# Patient Record
Sex: MALE | Race: WHITE | NOT HISPANIC OR LATINO | Employment: FULL TIME | URBAN - METROPOLITAN AREA
[De-identification: names, ages, dates, MRNs, and addresses within clinical notes are randomized per-mention and may not be internally consistent; named-entity substitution may affect disease eponyms.]

---

## 2017-01-04 ENCOUNTER — LAB VISIT (OUTPATIENT)
Dept: LAB | Facility: HOSPITAL | Age: 47
End: 2017-01-04
Attending: UROLOGY
Payer: COMMERCIAL

## 2017-01-04 DIAGNOSIS — E29.1 MALE HYPOGONADISM: ICD-10-CM

## 2017-01-04 LAB
ERYTHROCYTE [DISTWIDTH] IN BLOOD BY AUTOMATED COUNT: 11.8 %
HCT VFR BLD AUTO: 44.5 %
HGB BLD-MCNC: 15.8 G/DL
MCH RBC QN AUTO: 32.4 PG
MCHC RBC AUTO-ENTMCNC: 35.5 %
MCV RBC AUTO: 91 FL
PLATELET # BLD AUTO: 219 K/UL
PMV BLD AUTO: 10.1 FL
RBC # BLD AUTO: 4.87 M/UL
TESTOST SERPL-MCNC: 226 NG/DL
WBC # BLD AUTO: 4.43 K/UL

## 2017-01-04 PROCEDURE — 36415 COLL VENOUS BLD VENIPUNCTURE: CPT | Mod: PO

## 2017-01-04 PROCEDURE — 85027 COMPLETE CBC AUTOMATED: CPT

## 2017-01-04 PROCEDURE — 84403 ASSAY OF TOTAL TESTOSTERONE: CPT

## 2017-01-11 ENCOUNTER — OFFICE VISIT (OUTPATIENT)
Dept: UROLOGY | Facility: CLINIC | Age: 47
End: 2017-01-11
Payer: COMMERCIAL

## 2017-01-11 VITALS
BODY MASS INDEX: 34.53 KG/M2 | DIASTOLIC BLOOD PRESSURE: 90 MMHG | WEIGHT: 220 LBS | HEIGHT: 67 IN | SYSTOLIC BLOOD PRESSURE: 128 MMHG | HEART RATE: 85 BPM

## 2017-01-11 DIAGNOSIS — R79.89 LOW TESTOSTERONE: Primary | ICD-10-CM

## 2017-01-11 PROCEDURE — 99213 OFFICE O/P EST LOW 20 MIN: CPT | Mod: S$GLB,,, | Performed by: UROLOGY

## 2017-01-11 PROCEDURE — 1159F MED LIST DOCD IN RCRD: CPT | Mod: S$GLB,,, | Performed by: UROLOGY

## 2017-01-11 PROCEDURE — 99999 PR PBB SHADOW E&M-EST. PATIENT-LVL III: CPT | Mod: PBBFAC,,, | Performed by: UROLOGY

## 2017-01-11 RX ORDER — TESTOSTERONE CYPIONATE 200 MG/ML
200 INJECTION, SOLUTION INTRAMUSCULAR
Qty: 10 ML | Refills: 2 | Status: SHIPPED | OUTPATIENT
Start: 2017-01-11 | End: 2018-03-23

## 2017-01-11 NOTE — PROGRESS NOTES
Subjective:       Patient ID: Emily aWlls is a 46 y.o. male.    Chief Complaint: Annual Exam    HPI     46 year old with low testosterone. His baseline testosterone is 157. He complains of decreased energy and libido. Symptoms began > 4 years ago. His SHARMAINE score 8/10 positive. He completed a 6 months trial of IM testosterone and he was doing well with improved energy and libido.  He is now off testosterone for the last 6 months.  Rebound testosterone is 226.  No family history of prostate cancer.  PSA 0.6 4/16  Urine dipstick shows negative for all components.    Review of Systems   Constitutional: Negative for fever.   Genitourinary: Negative for dysuria and hematuria.       Objective:      Physical Exam   Constitutional: He is oriented to person, place, and time. He appears well-developed and well-nourished.   Pulmonary/Chest: Effort normal.   Neurological: He is alert and oriented to person, place, and time.   Skin: No rash noted.   Psychiatric: He has a normal mood and affect.   Vitals reviewed.      Assessment:       1. Low testosterone        Plan:       Low testosterone  -     testosterone cypionate (DEPOTESTOTERONE CYPIONATE) 200 mg/mL injection; Inject 1 mL (200 mg total) into the muscle every 14 (fourteen) days.  Dispense: 10 mL; Refill: 2  -     Prostate Specific Antigen, Diagnostic; Future; Expected date: 7/14/17  -     Testosterone; Future; Expected date: 7/14/17  -     Hematocrit; Future; Expected date: 7/14/17  -     Hemoglobin; Future; Expected date: 7/14/17        Resume testosterone replacement.  200 mg IM every 2 weeks. RTC 6 months with labs.

## 2017-01-11 NOTE — MR AVS SNAPSHOT
"    [facility]  [address] [facility] [address]  [facility] [place] [address]  Phone: [phone]                  Emily Walls   2017 9:30 AM   Office Visit    Description:  Male : 1970   Provider:  GERARD Hull MD   Department:  [facility]           Reason for Visit     Annual Exam                To Do List           Goals (5 Years of Data)     None      [facility]     [facility] Care Line -  Assistance  Registered nurses in the [facility] provide clinical advisement, health education, appointment booking, and other advisory services.  Call for this free service at [phone].             Medications           Message regarding Medications     Verify the changes and/or additions to your medication regime listed below are the same as discussed with your clinician today.  If any of these changes or additions are incorrect, please notify your healthcare provider.        STOP taking these medications     guaifenesin (MUCINEX) 600 mg 12 hr tablet Take 1 tablet (600 mg total) by mouth 2 (two) times daily.           Verify that the below list of medications is an accurate representation of the medications you are currently taking.  If none reported, the list may be blank. If incorrect, please contact your healthcare provider. Carry this list with you in case of emergency.           Current Medications     busPIRone (BUSPAR) 15 MG tablet Take 15 mg by mouth 2 (two) times daily.           Clinical Reference Information           Vital Signs - Last Recorded  Most recent update: 2017  9:34 AM by Lynn Montero LPN    BP Pulse Ht Wt BMI    (!) 128/90 85 5' 7" (1.702 m) 99.8 kg (220 lb 0.3 oz) 34.46 kg/m2      Blood Pressure          Most Recent Value    BP  (!)  128/90      Allergies as of 2017     No Known Allergies      Immunizations Administered on Date of Encounter - 2017     None      "

## 2017-03-27 ENCOUNTER — TELEPHONE (OUTPATIENT)
Dept: UROLOGY | Facility: CLINIC | Age: 47
End: 2017-03-27

## 2017-03-27 RX ORDER — TADALAFIL 20 MG/1
20 TABLET ORAL DAILY PRN
COMMUNITY
End: 2018-03-23

## 2017-03-27 NOTE — TELEPHONE ENCOUNTER
----- Message from Ernestinarita Majano sent at 3/27/2017  9:09 AM CDT -----  Refill on [facility].  Please send into [facility].  Any questions call [phone].

## 2018-02-27 RX ORDER — OSELTAMIVIR PHOSPHATE 75 MG/1
75 CAPSULE ORAL 2 TIMES DAILY
Refills: 0 | COMMUNITY
Start: 2017-12-14 | End: 2018-03-23

## 2018-02-27 RX ORDER — BUSPIRONE HYDROCHLORIDE 7.5 MG/1
7.5 TABLET ORAL DAILY
Refills: 1 | COMMUNITY
Start: 2018-01-12 | End: 2018-03-23

## 2018-03-23 ENCOUNTER — OFFICE VISIT (OUTPATIENT)
Dept: UROLOGY | Facility: CLINIC | Age: 48
End: 2018-03-23
Payer: COMMERCIAL

## 2018-03-23 VITALS — HEIGHT: 67 IN | BODY MASS INDEX: 37.72 KG/M2 | WEIGHT: 240.31 LBS

## 2018-03-23 DIAGNOSIS — Z12.5 SCREENING FOR PROSTATE CANCER: ICD-10-CM

## 2018-03-23 DIAGNOSIS — R35.0 URINARY FREQUENCY: Primary | ICD-10-CM

## 2018-03-23 DIAGNOSIS — N40.1 ENLARGED PROSTATE WITH URINARY OBSTRUCTION: ICD-10-CM

## 2018-03-23 DIAGNOSIS — R79.89 LOW TESTOSTERONE LEVEL IN MALE: ICD-10-CM

## 2018-03-23 DIAGNOSIS — N13.8 ENLARGED PROSTATE WITH URINARY OBSTRUCTION: ICD-10-CM

## 2018-03-23 LAB
BILIRUB SERPL-MCNC: NORMAL MG/DL
BLOOD URINE, POC: NORMAL
COLOR, POC UA: YELLOW
GLUCOSE UR QL STRIP: NORMAL
KETONES UR QL STRIP: NORMAL
LEUKOCYTE ESTERASE URINE, POC: NORMAL
NITRITE, POC UA: NORMAL
PH, POC UA: 5.5
PROTEIN, POC: NORMAL
SPECIFIC GRAVITY, POC UA: 1.02
UROBILINOGEN, POC UA: NORMAL

## 2018-03-23 PROCEDURE — 81002 URINALYSIS NONAUTO W/O SCOPE: CPT | Mod: S$GLB,,, | Performed by: UROLOGY

## 2018-03-23 PROCEDURE — 99214 OFFICE O/P EST MOD 30 MIN: CPT | Mod: 25,S$GLB,, | Performed by: UROLOGY

## 2018-03-23 PROCEDURE — 99999 PR PBB SHADOW E&M-EST. PATIENT-LVL II: CPT | Mod: PBBFAC,,, | Performed by: UROLOGY

## 2018-03-23 RX ORDER — TAMSULOSIN HYDROCHLORIDE 0.4 MG/1
0.4 CAPSULE ORAL DAILY
Qty: 30 CAPSULE | Refills: 11 | Status: SHIPPED | OUTPATIENT
Start: 2018-03-23 | End: 2019-02-01 | Stop reason: ALTCHOICE

## 2018-03-23 RX ORDER — METOPROLOL SUCCINATE 50 MG/1
TABLET, EXTENDED RELEASE ORAL
COMMUNITY
Start: 2018-02-26 | End: 2022-01-17

## 2018-03-23 RX ORDER — LORAZEPAM 0.5 MG/1
0.5 TABLET ORAL EVERY 6 HOURS PRN
COMMUNITY
End: 2020-12-28

## 2018-03-23 RX ORDER — ATORVASTATIN CALCIUM 10 MG/1
TABLET, FILM COATED ORAL
COMMUNITY
Start: 2018-02-26 | End: 2019-08-21

## 2018-03-23 NOTE — PROGRESS NOTES
Subjective:       Patient ID: Emily Walls is a 47 y.o. male.    Chief Complaint: Follow-up (bladder pain)    HPI     47 year old with low testosterone.  His baseline testosterone is 223 and we resumed testosterone replacement last year.  Due to work schedule he was unable to continue and has no been on testosterone replacement for the last 6 months.  He complains of decreased energy and libido. Symptoms were improved while on testosterone injections.   He say he began having bladder pain 4 days ago.  The was acute onset.  Symptoms have improved today.  He had no other associated symptoms.  He denies hematuria and dysuria.  No change in voiding pattern although he does have significant obstructive symptoms at baseline.  AUA symptoms score is 25.  He says he was eating crawfish the day before symptoms started.  He has never had a problem with spicy foods in the past.  He denies constipation.  No new medication  Urine dipstick shows negative for all components.  PVR 90 ml     Review of Systems   Constitutional: Negative for fever.   Genitourinary: Negative for dysuria and hematuria.       Objective:      Physical Exam   Constitutional: He is oriented to person, place, and time. He appears well-developed and well-nourished.   HENT:   Head: Normocephalic and atraumatic.   Eyes: Conjunctivae are normal.   Cardiovascular: Normal rate.    Pulmonary/Chest: Effort normal.   Abdominal: Soft. He exhibits no distension. There is tenderness in the suprapubic area. There is no CVA tenderness.   Genitourinary: Penis normal. Rectal exam shows no mass and anal tone normal. Prostate is enlarged (30g, s/s/a). Prostate is not tender.   Genitourinary Comments: Bilateral testes atrophic   Musculoskeletal: Normal range of motion. He exhibits no edema.   Neurological: He is alert and oriented to person, place, and time.   Skin: Skin is warm and dry. No rash noted.   Psychiatric: He has a normal mood and affect.   Vitals reviewed.       Assessment:       1. Urinary frequency    2. Enlarged prostate with urinary obstruction    3. Screening for prostate cancer    4. Low testosterone level in male        Plan:       Urinary frequency  -     POCT URINE DIPSTICK WITHOUT MICROSCOPE    Enlarged prostate with urinary obstruction  -     tamsulosin (FLOMAX) 0.4 mg Cp24; Take 1 capsule (0.4 mg total) by mouth once daily.  Dispense: 30 capsule; Refill: 11    Screening for prostate cancer  -     PSA, Screening; Future; Expected date: 03/23/2018    Low testosterone level in male  -     Testosterone; Future; Expected date: 03/23/2018      f/u 3 months.  Start Flomax.

## 2018-10-09 ENCOUNTER — LAB VISIT (OUTPATIENT)
Dept: LAB | Facility: HOSPITAL | Age: 48
End: 2018-10-09
Attending: UROLOGY
Payer: COMMERCIAL

## 2018-10-09 DIAGNOSIS — Z12.5 SCREENING FOR PROSTATE CANCER: ICD-10-CM

## 2018-10-09 DIAGNOSIS — R79.89 LOW TESTOSTERONE LEVEL IN MALE: ICD-10-CM

## 2018-10-09 LAB
COMPLEXED PSA SERPL-MCNC: 0.26 NG/ML
TESTOST SERPL-MCNC: 183 NG/DL

## 2018-10-09 PROCEDURE — 36415 COLL VENOUS BLD VENIPUNCTURE: CPT | Mod: PO

## 2018-10-09 PROCEDURE — 84403 ASSAY OF TOTAL TESTOSTERONE: CPT

## 2018-10-09 PROCEDURE — 84153 ASSAY OF PSA TOTAL: CPT

## 2018-10-18 ENCOUNTER — OFFICE VISIT (OUTPATIENT)
Dept: UROLOGY | Facility: CLINIC | Age: 48
End: 2018-10-18
Payer: COMMERCIAL

## 2018-10-18 VITALS
HEIGHT: 67 IN | HEART RATE: 86 BPM | SYSTOLIC BLOOD PRESSURE: 126 MMHG | BODY MASS INDEX: 39.24 KG/M2 | DIASTOLIC BLOOD PRESSURE: 70 MMHG | WEIGHT: 250 LBS

## 2018-10-18 DIAGNOSIS — E29.1 MALE HYPOGONADISM: Primary | ICD-10-CM

## 2018-10-18 LAB
BILIRUB SERPL-MCNC: NORMAL MG/DL
BLOOD URINE, POC: NORMAL
COLOR, POC UA: YELLOW
GLUCOSE UR QL STRIP: NORMAL
KETONES UR QL STRIP: NORMAL
LEUKOCYTE ESTERASE URINE, POC: NORMAL
NITRITE, POC UA: NORMAL
PH, POC UA: 5
PROTEIN, POC: NORMAL
SPECIFIC GRAVITY, POC UA: 1.02
UROBILINOGEN, POC UA: NORMAL

## 2018-10-18 PROCEDURE — 3008F BODY MASS INDEX DOCD: CPT | Mod: CPTII,S$GLB,, | Performed by: UROLOGY

## 2018-10-18 PROCEDURE — 99213 OFFICE O/P EST LOW 20 MIN: CPT | Mod: 25,S$GLB,, | Performed by: UROLOGY

## 2018-10-18 PROCEDURE — 99999 PR PBB SHADOW E&M-EST. PATIENT-LVL III: CPT | Mod: PBBFAC,,, | Performed by: UROLOGY

## 2018-10-18 PROCEDURE — 81002 URINALYSIS NONAUTO W/O SCOPE: CPT | Mod: S$GLB,,, | Performed by: UROLOGY

## 2018-10-18 RX ORDER — TESTOSTERONE CYPIONATE 200 MG/ML
200 INJECTION, SOLUTION INTRAMUSCULAR
Qty: 10 ML | Refills: 2 | Status: SHIPPED | OUTPATIENT
Start: 2018-10-18 | End: 2019-08-07 | Stop reason: SDUPTHER

## 2018-10-18 NOTE — PROGRESS NOTES
Subjective:       Patient ID: Emily Walls is a 48 y.o. male.    Chief Complaint: Low Testosterone    HPI     48 year old with low testosterone.   He had been on IM testosterone replacement in the past and had improvement in his symptoms.  Due to his work schedule the injections became problematic and he stopped injection last year.  He now complains of decreased energy and libido.   He wants to resume injections.  Rebound testosterone is 183.  No family history of prostate cancer.  PSA 0.26 10/18  Urine dipstick shows negative for all components.    Review of Systems   Constitutional: Negative for fever.   Genitourinary: Negative for dysuria and hematuria.       Objective:      Physical Exam   Constitutional: He is oriented to person, place, and time. He appears well-developed and well-nourished.   Pulmonary/Chest: Effort normal.   Neurological: He is alert and oriented to person, place, and time.   Skin: No rash noted.   Psychiatric: He has a normal mood and affect.   Vitals reviewed.      Assessment:       1. Male hypogonadism        Plan:       Male hypogonadism  -     POCT URINE DIPSTICK WITHOUT MICROSCOPE  -     Testosterone; Future; Expected date: 01/18/2019  -     Hematocrit; Future; Expected date: 01/18/2019  -     Hemoglobin; Future; Expected date: 01/18/2019    Other orders  -     testosterone cypionate (DEPOTESTOTERONE CYPIONATE) 200 mg/mL injection; Inject 1 mL (200 mg total) into the muscle every 14 (fourteen) days.  Dispense: 10 mL; Refill: 2      Resume testosterone replacement.  RTC 3 months with repeat labs.

## 2019-01-29 ENCOUNTER — LAB VISIT (OUTPATIENT)
Dept: LAB | Facility: HOSPITAL | Age: 49
End: 2019-01-29
Attending: UROLOGY
Payer: COMMERCIAL

## 2019-01-29 DIAGNOSIS — E29.1 MALE HYPOGONADISM: ICD-10-CM

## 2019-01-29 LAB
HCT VFR BLD AUTO: 49.7 %
HGB BLD-MCNC: 16.7 G/DL
TESTOST SERPL-MCNC: 891 NG/DL

## 2019-01-29 PROCEDURE — 85018 HEMOGLOBIN: CPT

## 2019-01-29 PROCEDURE — 84403 ASSAY OF TOTAL TESTOSTERONE: CPT

## 2019-01-29 PROCEDURE — 36415 COLL VENOUS BLD VENIPUNCTURE: CPT | Mod: PO

## 2019-01-29 PROCEDURE — 85014 HEMATOCRIT: CPT

## 2019-02-01 ENCOUNTER — OFFICE VISIT (OUTPATIENT)
Dept: UROLOGY | Facility: CLINIC | Age: 49
End: 2019-02-01
Payer: COMMERCIAL

## 2019-02-01 VITALS — BODY MASS INDEX: 39.65 KG/M2 | HEIGHT: 67 IN | WEIGHT: 252.63 LBS

## 2019-02-01 DIAGNOSIS — E29.1 MALE HYPOGONADISM: Primary | ICD-10-CM

## 2019-02-01 LAB
BILIRUB SERPL-MCNC: NORMAL MG/DL
BLOOD URINE, POC: NORMAL
COLOR, POC UA: YELLOW
GLUCOSE UR QL STRIP: NORMAL
KETONES UR QL STRIP: NORMAL
LEUKOCYTE ESTERASE URINE, POC: NORMAL
NITRITE, POC UA: NORMAL
PH, POC UA: 6
PROTEIN, POC: NORMAL
SPECIFIC GRAVITY, POC UA: 1.02
UROBILINOGEN, POC UA: NORMAL

## 2019-02-01 PROCEDURE — 99213 PR OFFICE/OUTPT VISIT, EST, LEVL III, 20-29 MIN: ICD-10-PCS | Mod: 25,S$GLB,, | Performed by: UROLOGY

## 2019-02-01 PROCEDURE — 81002 POCT URINE DIPSTICK WITHOUT MICROSCOPE: ICD-10-PCS | Mod: S$GLB,,, | Performed by: UROLOGY

## 2019-02-01 PROCEDURE — 99999 PR PBB SHADOW E&M-EST. PATIENT-LVL III: ICD-10-PCS | Mod: PBBFAC,,, | Performed by: UROLOGY

## 2019-02-01 PROCEDURE — 81002 URINALYSIS NONAUTO W/O SCOPE: CPT | Mod: S$GLB,,, | Performed by: UROLOGY

## 2019-02-01 PROCEDURE — 99213 OFFICE O/P EST LOW 20 MIN: CPT | Mod: 25,S$GLB,, | Performed by: UROLOGY

## 2019-02-01 PROCEDURE — 99999 PR PBB SHADOW E&M-EST. PATIENT-LVL III: CPT | Mod: PBBFAC,,, | Performed by: UROLOGY

## 2019-02-01 PROCEDURE — 3008F BODY MASS INDEX DOCD: CPT | Mod: CPTII,S$GLB,, | Performed by: UROLOGY

## 2019-02-01 PROCEDURE — 3008F PR BODY MASS INDEX (BMI) DOCUMENTED: ICD-10-PCS | Mod: CPTII,S$GLB,, | Performed by: UROLOGY

## 2019-02-01 RX ORDER — METFORMIN HYDROCHLORIDE 500 MG/1
TABLET ORAL
Refills: 2 | COMMUNITY
Start: 2018-12-12 | End: 2019-02-01 | Stop reason: ALTCHOICE

## 2019-02-01 NOTE — PROGRESS NOTES
Subjective:       Patient ID: Emily Walls is a 48 y.o. male.    Chief Complaint: Follow-up and Low Testosterone    HPI     48 year old with low testosterone.   He had been on IM testosterone replacement in the past.   Due to his work schedule the injections became problematic and he stopped injections but now has resumed for the last 3 months.  He is doing well.  He feels there is increased energy and libido.  Rebound testosterone was 183.  No family history of prostate cancer.  PSA 0.26 10/18.  He has mild PV dribbling and tried Flomax.  He had no improvement.  Symptoms are not bothersome.  Peak testosterone is 891,  H&H normal.  Urine dipstick shows negative for all components.    Review of Systems   Constitutional: Negative for fever.   Genitourinary: Negative for dysuria and hematuria.       Objective:      Physical Exam   Constitutional: He is oriented to person, place, and time. He appears well-developed and well-nourished.   Pulmonary/Chest: Effort normal.   Abdominal: Soft.   Neurological: He is alert and oriented to person, place, and time.   Skin: No rash noted.   Psychiatric: He has a normal mood and affect.   Vitals reviewed.      Assessment:       1. Male hypogonadism        Plan:       Male hypogonadism  -     POCT urine dipstick without microscope  -     Testosterone; Future; Expected date: 08/04/2019  -     Prostate Specific Antigen, Diagnostic; Future; Expected date: 08/04/2019  -     Hemoglobin; Future; Expected date: 08/04/2019  -     Hematocrit; Future; Expected date: 08/04/2019        Continue testosterone at current does.  RTC 6 months with repeat labs.

## 2019-03-06 DIAGNOSIS — N52.9 ERECTILE DYSFUNCTION, UNSPECIFIED ERECTILE DYSFUNCTION TYPE: Primary | ICD-10-CM

## 2019-03-06 RX ORDER — TADALAFIL 20 MG/1
20 TABLET ORAL DAILY
Qty: 30 TABLET | Refills: 11 | Status: SHIPPED | OUTPATIENT
Start: 2019-03-06 | End: 2019-03-07 | Stop reason: SDUPTHER

## 2019-03-07 DIAGNOSIS — N52.9 ERECTILE DYSFUNCTION, UNSPECIFIED ERECTILE DYSFUNCTION TYPE: ICD-10-CM

## 2019-03-11 RX ORDER — TADALAFIL 20 MG/1
20 TABLET ORAL DAILY
Qty: 30 TABLET | Refills: 11 | Status: SHIPPED | OUTPATIENT
Start: 2019-03-11 | End: 2020-03-13 | Stop reason: SDUPTHER

## 2019-08-07 DIAGNOSIS — E29.1 MALE HYPOGONADISM: Primary | ICD-10-CM

## 2019-08-07 RX ORDER — TESTOSTERONE CYPIONATE 200 MG/ML
200 INJECTION, SOLUTION INTRAMUSCULAR
Qty: 2 ML | Refills: 0 | Status: SHIPPED | OUTPATIENT
Start: 2019-08-07 | End: 2019-08-21 | Stop reason: SDUPTHER

## 2019-08-07 NOTE — TELEPHONE ENCOUNTER
Spoke to pt and he is scheduled for labs. Pt needs enough medication to give himself two more testosterones injection prior to his apt to see you. Please advise.

## 2019-08-07 NOTE — TELEPHONE ENCOUNTER
----- Message from Solo Tony sent at 8/7/2019  9:57 AM CDT -----  Contact: patient  Type:  RX Refill Request    Who Called:    Refill or New Rx:  refill  RX Name and Strength:  testosterone cypionate (DEPOTESTOTERONE CYPIONATE) 200 mg/mL injection  How is the patient currently taking it? (ex. 1XDay):    Is this a 30 day or 90 day RX:    Preferred Pharmacy with phone number:  [facility]  Local or Mail Order:  local  Ordering Provider:    [facility] Call Back Number:  [phone]  Additional Information:  Requesting a call back when script has been sent

## 2019-08-13 ENCOUNTER — LAB VISIT (OUTPATIENT)
Dept: LAB | Facility: HOSPITAL | Age: 49
End: 2019-08-13
Attending: UROLOGY
Payer: COMMERCIAL

## 2019-08-13 DIAGNOSIS — E29.1 MALE HYPOGONADISM: ICD-10-CM

## 2019-08-13 LAB
COMPLEXED PSA SERPL-MCNC: 0.38 NG/ML (ref 0–4)
HCT VFR BLD AUTO: 51.8 % (ref 40–54)
HGB BLD-MCNC: 17.7 G/DL (ref 14–18)
TESTOST SERPL-MCNC: 822 NG/DL (ref 304–1227)

## 2019-08-13 PROCEDURE — 36415 COLL VENOUS BLD VENIPUNCTURE: CPT | Mod: PO

## 2019-08-13 PROCEDURE — 84403 ASSAY OF TOTAL TESTOSTERONE: CPT

## 2019-08-13 PROCEDURE — 85018 HEMOGLOBIN: CPT

## 2019-08-13 PROCEDURE — 85014 HEMATOCRIT: CPT

## 2019-08-13 PROCEDURE — 84153 ASSAY OF PSA TOTAL: CPT

## 2019-08-21 ENCOUNTER — OFFICE VISIT (OUTPATIENT)
Dept: UROLOGY | Facility: CLINIC | Age: 49
End: 2019-08-21
Payer: COMMERCIAL

## 2019-08-21 VITALS
WEIGHT: 244.69 LBS | SYSTOLIC BLOOD PRESSURE: 106 MMHG | BODY MASS INDEX: 38.4 KG/M2 | HEIGHT: 67 IN | HEART RATE: 76 BPM | DIASTOLIC BLOOD PRESSURE: 71 MMHG

## 2019-08-21 DIAGNOSIS — E29.1 MALE HYPOGONADISM: Primary | ICD-10-CM

## 2019-08-21 LAB
BILIRUB SERPL-MCNC: NORMAL MG/DL
BLOOD URINE, POC: NORMAL
COLOR, POC UA: NORMAL
GLUCOSE UR QL STRIP: NORMAL
KETONES UR QL STRIP: NORMAL
LEUKOCYTE ESTERASE URINE, POC: NORMAL
NITRITE, POC UA: NORMAL
PH, POC UA: 5.5
PROTEIN, POC: NORMAL
SPECIFIC GRAVITY, POC UA: 1020
UROBILINOGEN, POC UA: NORMAL

## 2019-08-21 PROCEDURE — 81002 URINALYSIS NONAUTO W/O SCOPE: CPT | Mod: S$GLB,,, | Performed by: UROLOGY

## 2019-08-21 PROCEDURE — 99999 PR PBB SHADOW E&M-EST. PATIENT-LVL III: ICD-10-PCS | Mod: PBBFAC,,, | Performed by: UROLOGY

## 2019-08-21 PROCEDURE — 99213 PR OFFICE/OUTPT VISIT, EST, LEVL III, 20-29 MIN: ICD-10-PCS | Mod: 25,S$GLB,, | Performed by: UROLOGY

## 2019-08-21 PROCEDURE — 99999 PR PBB SHADOW E&M-EST. PATIENT-LVL III: CPT | Mod: PBBFAC,,, | Performed by: UROLOGY

## 2019-08-21 PROCEDURE — 99213 OFFICE O/P EST LOW 20 MIN: CPT | Mod: 25,S$GLB,, | Performed by: UROLOGY

## 2019-08-21 PROCEDURE — 3008F PR BODY MASS INDEX (BMI) DOCUMENTED: ICD-10-PCS | Mod: CPTII,S$GLB,, | Performed by: UROLOGY

## 2019-08-21 PROCEDURE — 81002 POCT URINE DIPSTICK WITHOUT MICROSCOPE: ICD-10-PCS | Mod: S$GLB,,, | Performed by: UROLOGY

## 2019-08-21 PROCEDURE — 3008F BODY MASS INDEX DOCD: CPT | Mod: CPTII,S$GLB,, | Performed by: UROLOGY

## 2019-08-21 RX ORDER — TESTOSTERONE CYPIONATE 200 MG/ML
200 INJECTION, SOLUTION INTRAMUSCULAR
Qty: 10 ML | Refills: 5 | Status: SHIPPED | OUTPATIENT
Start: 2019-08-21 | End: 2020-03-24 | Stop reason: SDUPTHER

## 2019-08-21 RX ORDER — LORAZEPAM 1 MG/1
1 TABLET ORAL DAILY
Refills: 5 | COMMUNITY
Start: 2019-08-06 | End: 2023-06-22 | Stop reason: SDUPTHER

## 2019-08-21 NOTE — PROGRESS NOTES
Subjective:       Patient ID: Emily Walls is a 49 y.o. male.    Chief Complaint: Male hypogonadism (follow up)    HPI     49 year old with low testosterone.   He is on IM testosterone replacement and his current doses 200 mg every 2 weeks.  He is doing well.  He feels there is increased energy and libido.  Peak testosterone is 822.  No family history of prostate cancer.  PSA 0.38 8/19.   he has no bothersome urinary symptoms.  H&H normal.  Urine dipstick shows negative for all components.    Review of Systems   Constitutional: Negative for fever.   Genitourinary: Negative for dysuria and hematuria.       Objective:      Physical Exam   Constitutional: He is oriented to person, place, and time. He appears well-developed and well-nourished.   Pulmonary/Chest: Effort normal.   Neurological: He is alert and oriented to person, place, and time.   Skin: No rash noted.   Psychiatric: He has a normal mood and affect.   Vitals reviewed.      Assessment:       1. Male hypogonadism        Plan:       Male hypogonadism  -     POCT URINE DIPSTICK WITHOUT MICROSCOPE  -     testosterone cypionate (DEPOTESTOTERONE CYPIONATE) 200 mg/mL injection; Inject 1 mL (200 mg total) into the muscle every 14 (fourteen) days.  Dispense: 10 mL; Refill: 5  -     Testosterone; Future; Expected date: 02/21/2020  -     Hemoglobin; Future; Expected date: 02/21/2020  -     Hematocrit; Future; Expected date: 02/21/2020      continue testosterone at current dose.  Follow-up 6 months with repeat labs.

## 2020-03-13 DIAGNOSIS — N52.9 ERECTILE DYSFUNCTION, UNSPECIFIED ERECTILE DYSFUNCTION TYPE: ICD-10-CM

## 2020-03-13 RX ORDER — TADALAFIL 20 MG/1
20 TABLET ORAL DAILY
Qty: 30 TABLET | Refills: 11 | Status: SHIPPED | OUTPATIENT
Start: 2020-03-13 | End: 2020-12-28 | Stop reason: DRUGHIGH

## 2020-03-13 NOTE — TELEPHONE ENCOUNTER
----- Message from Jennifer Truong sent at 3/13/2020  9:52 AM CDT -----  Contact: self [phone]  Patient requesting a refill on tadalafil (CIALIS) 20 MG Tab.    [facility] - [place] - [address]  [address] [place] [address]  Phone: [phone] Fax: [phone]    Please call patient at [phone]. Thanks!

## 2020-03-17 ENCOUNTER — TELEPHONE (OUTPATIENT)
Dept: UROLOGY | Facility: CLINIC | Age: 50
End: 2020-03-17

## 2020-03-17 NOTE — TELEPHONE ENCOUNTER
----- Message from Kanwal Rao sent at 3/17/2020  1:42 PM CDT -----  Contact: Pt  Type: Needs Medical Advice    Who Called: Pt  Symptoms (please be specific):    How long has patient had these symptoms:    Pharmacy name and phone #:    [facility] Call Back Number:  [phone]  Additional Information: need approval for refill of   testosterone cypionate (DEPOTESTOTERONE CYPIONATE) 200 mg/mL injection [[id number]]

## 2020-03-17 NOTE — TELEPHONE ENCOUNTER
Called in refill for enough medication for 2 injections and advised to follow up for bloodwork and fllow-up with doctor(possible video)

## 2020-03-23 ENCOUNTER — LAB VISIT (OUTPATIENT)
Dept: LAB | Facility: HOSPITAL | Age: 50
End: 2020-03-23
Attending: UROLOGY
Payer: COMMERCIAL

## 2020-03-23 DIAGNOSIS — E29.1 MALE HYPOGONADISM: ICD-10-CM

## 2020-03-23 LAB
HCT VFR BLD AUTO: 49.4 % (ref 40–54)
HGB BLD-MCNC: 16.5 G/DL (ref 14–18)
TESTOST SERPL-MCNC: 998 NG/DL (ref 304–1227)

## 2020-03-23 PROCEDURE — 85014 HEMATOCRIT: CPT

## 2020-03-23 PROCEDURE — 84403 ASSAY OF TOTAL TESTOSTERONE: CPT

## 2020-03-23 PROCEDURE — 36415 COLL VENOUS BLD VENIPUNCTURE: CPT | Mod: PO

## 2020-03-23 PROCEDURE — 85018 HEMOGLOBIN: CPT

## 2020-03-24 DIAGNOSIS — E29.1 MALE HYPOGONADISM: ICD-10-CM

## 2020-03-25 RX ORDER — TESTOSTERONE CYPIONATE 200 MG/ML
200 INJECTION, SOLUTION INTRAMUSCULAR
Qty: 10 ML | Refills: 1 | Status: SHIPPED | OUTPATIENT
Start: 2020-03-25 | End: 2020-09-16

## 2020-03-27 ENCOUNTER — PATIENT MESSAGE (OUTPATIENT)
Dept: UROLOGY | Facility: CLINIC | Age: 50
End: 2020-03-27

## 2020-03-27 ENCOUNTER — OFFICE VISIT (OUTPATIENT)
Dept: UROLOGY | Facility: CLINIC | Age: 50
End: 2020-03-27
Payer: COMMERCIAL

## 2020-03-27 DIAGNOSIS — R79.89 LOW TESTOSTERONE LEVEL IN MALE: ICD-10-CM

## 2020-03-27 DIAGNOSIS — Z12.5 SCREENING FOR PROSTATE CANCER: Primary | ICD-10-CM

## 2020-03-27 PROCEDURE — 99213 PR OFFICE/OUTPT VISIT, EST, LEVL III, 20-29 MIN: ICD-10-PCS | Mod: 95,,, | Performed by: UROLOGY

## 2020-03-27 PROCEDURE — 99213 OFFICE O/P EST LOW 20 MIN: CPT | Mod: 95,,, | Performed by: UROLOGY

## 2020-03-27 NOTE — PROGRESS NOTES
The patient location is:  home  The chief complaint leading to consultation is:  Low testosterone  Visit type: Virtual visit with synchronous audio and video  Total time spent with patient:  15 min  Each patient to whom he or she provides medical services by telemedicine is:  (1) informed of the relationship between the physician and patient and the respective role of any other health care provider with respect to management of the patient; and (2) notified that he or she may decline to receive medical services by telemedicine and may withdraw from such care at any time.    Notes:  See below      Subjective:       Patient ID: Emily Walls is a 49 y.o. male.    Chief Complaint: No chief complaint on file.    HPI     49 year old with low testosterone.   He is on IM testosterone replacement and his current doses 200 mg every 2 weeks.  He is doing well.  He feels there is increased energy and libido.  Peak testosterone is 998.  No family history of prostate cancer.  PSA 0.38 8/19.   he has no bothersome urinary symptoms.  H&H normal.    Review of Systems   Constitutional: Negative for fever.   Genitourinary: Negative for dysuria and hematuria.       Objective:      Physical Exam     none-virtual visit     Assessment:       1. Screening for prostate cancer    2. Low testosterone level in male        Plan:       Screening for prostate cancer  -     PSA, Screening; Future; Expected date: 09/27/2020  -     Testosterone; Future; Expected date: 09/27/2020  -     Hemoglobin; Future; Expected date: 09/27/2020  -     Hematocrit; Future; Expected date: 09/27/2020    Low testosterone level in male      continue testosterone at current dose.  Follow-up 6 months with repeat labs.

## 2020-11-05 ENCOUNTER — LAB VISIT (OUTPATIENT)
Dept: LAB | Facility: HOSPITAL | Age: 50
End: 2020-11-05
Attending: UROLOGY
Payer: COMMERCIAL

## 2020-11-05 DIAGNOSIS — Z12.5 SCREENING FOR PROSTATE CANCER: ICD-10-CM

## 2020-11-05 LAB
COMPLEXED PSA SERPL-MCNC: 0.33 NG/ML (ref 0–4)
HCT VFR BLD AUTO: 48.5 % (ref 40–54)
HGB BLD-MCNC: 16.5 G/DL (ref 14–18)
TESTOST SERPL-MCNC: 620 NG/DL (ref 304–1227)

## 2020-11-05 PROCEDURE — 84403 ASSAY OF TOTAL TESTOSTERONE: CPT

## 2020-11-05 PROCEDURE — 84153 ASSAY OF PSA TOTAL: CPT

## 2020-11-05 PROCEDURE — 85014 HEMATOCRIT: CPT

## 2020-11-05 PROCEDURE — 85018 HEMOGLOBIN: CPT

## 2020-11-05 PROCEDURE — 36415 COLL VENOUS BLD VENIPUNCTURE: CPT | Mod: PO

## 2020-12-28 ENCOUNTER — OFFICE VISIT (OUTPATIENT)
Dept: UROLOGY | Facility: CLINIC | Age: 50
End: 2020-12-28
Payer: COMMERCIAL

## 2020-12-28 VITALS
DIASTOLIC BLOOD PRESSURE: 92 MMHG | HEART RATE: 84 BPM | WEIGHT: 244.69 LBS | BODY MASS INDEX: 38.4 KG/M2 | SYSTOLIC BLOOD PRESSURE: 142 MMHG | HEIGHT: 67 IN

## 2020-12-28 DIAGNOSIS — N40.1 ENLARGED PROSTATE WITH URINARY OBSTRUCTION: ICD-10-CM

## 2020-12-28 DIAGNOSIS — N52.9 ERECTILE DYSFUNCTION, UNSPECIFIED ERECTILE DYSFUNCTION TYPE: ICD-10-CM

## 2020-12-28 DIAGNOSIS — Z12.5 SCREENING FOR PROSTATE CANCER: Primary | ICD-10-CM

## 2020-12-28 DIAGNOSIS — N13.8 ENLARGED PROSTATE WITH URINARY OBSTRUCTION: ICD-10-CM

## 2020-12-28 DIAGNOSIS — R79.89 LOW TESTOSTERONE LEVEL IN MALE: ICD-10-CM

## 2020-12-28 PROCEDURE — 1126F PR PAIN SEVERITY QUANTIFIED, NO PAIN PRESENT: ICD-10-PCS | Mod: S$GLB,,, | Performed by: UROLOGY

## 2020-12-28 PROCEDURE — 99999 PR PBB SHADOW E&M-EST. PATIENT-LVL III: CPT | Mod: PBBFAC,,, | Performed by: UROLOGY

## 2020-12-28 PROCEDURE — 3008F PR BODY MASS INDEX (BMI) DOCUMENTED: ICD-10-PCS | Mod: CPTII,S$GLB,, | Performed by: UROLOGY

## 2020-12-28 PROCEDURE — 1126F AMNT PAIN NOTED NONE PRSNT: CPT | Mod: S$GLB,,, | Performed by: UROLOGY

## 2020-12-28 PROCEDURE — 99214 PR OFFICE/OUTPT VISIT, EST, LEVL IV, 30-39 MIN: ICD-10-PCS | Mod: S$GLB,,, | Performed by: UROLOGY

## 2020-12-28 PROCEDURE — 99214 OFFICE O/P EST MOD 30 MIN: CPT | Mod: S$GLB,,, | Performed by: UROLOGY

## 2020-12-28 PROCEDURE — 3008F BODY MASS INDEX DOCD: CPT | Mod: CPTII,S$GLB,, | Performed by: UROLOGY

## 2020-12-28 PROCEDURE — 99999 PR PBB SHADOW E&M-EST. PATIENT-LVL III: ICD-10-PCS | Mod: PBBFAC,,, | Performed by: UROLOGY

## 2020-12-28 RX ORDER — TADALAFIL 5 MG/1
5 TABLET ORAL DAILY PRN
Qty: 30 TABLET | Refills: 11 | Status: SHIPPED | OUTPATIENT
Start: 2020-12-28 | End: 2022-01-13 | Stop reason: SDUPTHER

## 2020-12-28 RX ORDER — BUPROPION HYDROCHLORIDE 150 MG/1
150 TABLET, EXTENDED RELEASE ORAL EVERY MORNING
COMMUNITY
Start: 2020-12-11 | End: 2022-01-17

## 2020-12-28 RX ORDER — INDOMETHACIN 25 MG/1
CAPSULE ORAL
COMMUNITY
Start: 2020-11-07 | End: 2022-01-17

## 2020-12-28 RX ORDER — COLCHICINE 0.6 MG/1
0.6 TABLET ORAL DAILY PRN
COMMUNITY
Start: 2020-12-11 | End: 2023-07-13 | Stop reason: SDUPTHER

## 2020-12-28 NOTE — PROGRESS NOTES
Subjective:       Patient ID: Emily Walls is a 50 y.o. male.    Chief Complaint: Annual Exam    HPI     50 year old with low testosterone.   He is on IM testosterone replacement and his current doses 200 mg every 2 weeks.  He is doing well.  He feels there is increased energy and libido.  Peak testosterone is 620.  No family history of prostate cancer.  PSA is 0.33.  He has moderate obstructive urinary symptoms.  He was given a prescription for Flomax which he did not feel was effective.  He complains of postvoid dribbling but he says this is not bothersome.  H&H normal.  He also has ED.  He takes daily Cialis which is effective.  Urine dipstick shows negative for all components.    Review of Systems   Constitutional: Negative for fever.   Genitourinary: Negative for dysuria and hematuria.       Objective:      Physical Exam  Vitals signs reviewed.   Constitutional:       Appearance: He is well-developed.   HENT:      Head: Normocephalic and atraumatic.   Eyes:      Conjunctiva/sclera: Conjunctivae normal.   Cardiovascular:      Rate and Rhythm: Normal rate.   Pulmonary:      Effort: Pulmonary effort is normal.   Genitourinary:     Prostate: Enlarged (30g, s/s/a). Not tender.      Rectum: No mass. Normal anal tone.   Musculoskeletal: Normal range of motion.   Skin:     General: Skin is warm and dry.      Findings: No rash.   Neurological:      Mental Status: He is alert and oriented to person, place, and time.         Assessment:       1. Screening for prostate cancer    2. Low testosterone level in male    3. Erectile dysfunction, unspecified erectile dysfunction type    4. Enlarged prostate with urinary obstruction        Plan:       Screening for prostate cancer  -     PSA, Screening; Future; Expected date: 12/28/2021    Low testosterone level in male  -     Hematocrit; Future; Expected date: 12/28/2021  -     Hemoglobin; Future; Expected date: 12/28/2021  -     Testosterone; Future; Expected date:  12/28/2021    Erectile dysfunction, unspecified erectile dysfunction type  -     tadalafiL (CIALIS) 5 MG tablet; Take 1 tablet (5 mg total) by mouth daily as needed for Erectile Dysfunction.  Dispense: 30 tablet; Refill: 11    Enlarged prostate with urinary obstruction      continue testosterone at current dose.  Follow-up 1 year.

## 2021-03-24 DIAGNOSIS — E29.1 MALE HYPOGONADISM: ICD-10-CM

## 2021-03-26 RX ORDER — TESTOSTERONE CYPIONATE 200 MG/ML
INJECTION, SOLUTION INTRAMUSCULAR
Qty: 10 ML | Refills: 1 | Status: SHIPPED | OUTPATIENT
Start: 2021-03-26 | End: 2021-10-20 | Stop reason: SDUPTHER

## 2021-05-06 ENCOUNTER — PATIENT MESSAGE (OUTPATIENT)
Dept: RESEARCH | Facility: HOSPITAL | Age: 51
End: 2021-05-06

## 2021-10-20 DIAGNOSIS — E29.1 MALE HYPOGONADISM: ICD-10-CM

## 2021-10-20 RX ORDER — TESTOSTERONE CYPIONATE 200 MG/ML
INJECTION, SOLUTION INTRAMUSCULAR
Qty: 10 ML | Refills: 1 | Status: SHIPPED | OUTPATIENT
Start: 2021-10-20 | End: 2022-01-10 | Stop reason: SDUPTHER

## 2022-01-07 DIAGNOSIS — N52.9 ERECTILE DYSFUNCTION, UNSPECIFIED ERECTILE DYSFUNCTION TYPE: ICD-10-CM

## 2022-01-07 DIAGNOSIS — E29.1 MALE HYPOGONADISM: ICD-10-CM

## 2022-01-07 RX ORDER — TADALAFIL 5 MG/1
5 TABLET ORAL DAILY PRN
Qty: 30 TABLET | Refills: 11 | Status: CANCELLED | OUTPATIENT
Start: 2022-01-07 | End: 2023-01-07

## 2022-01-10 RX ORDER — TESTOSTERONE CYPIONATE 200 MG/ML
INJECTION, SOLUTION INTRAMUSCULAR
Qty: 10 ML | Refills: 0 | Status: SHIPPED | OUTPATIENT
Start: 2022-01-10 | End: 2022-09-02

## 2022-01-11 ENCOUNTER — PATIENT MESSAGE (OUTPATIENT)
Dept: ADMINISTRATIVE | Facility: OTHER | Age: 52
End: 2022-01-11
Payer: COMMERCIAL

## 2022-01-13 DIAGNOSIS — N52.9 ERECTILE DYSFUNCTION, UNSPECIFIED ERECTILE DYSFUNCTION TYPE: ICD-10-CM

## 2022-01-13 RX ORDER — TADALAFIL 5 MG/1
5 TABLET ORAL DAILY PRN
Qty: 30 TABLET | Refills: 11 | Status: SHIPPED | OUTPATIENT
Start: 2022-01-13 | End: 2022-02-28 | Stop reason: DRUGHIGH

## 2022-01-17 PROBLEM — I10 PRIMARY HYPERTENSION: Status: ACTIVE | Noted: 2022-01-17

## 2022-01-17 PROBLEM — R07.89 ATYPICAL CHEST PAIN: Status: ACTIVE | Noted: 2022-01-17

## 2022-02-06 PROBLEM — Z86.711 HISTORY OF PULMONARY EMBOLUS (PE): Status: ACTIVE | Noted: 2022-02-06

## 2022-02-28 ENCOUNTER — OFFICE VISIT (OUTPATIENT)
Dept: UROLOGY | Facility: CLINIC | Age: 52
End: 2022-02-28
Payer: COMMERCIAL

## 2022-02-28 VITALS — BODY MASS INDEX: 40.1 KG/M2 | HEIGHT: 67 IN | WEIGHT: 255.5 LBS

## 2022-02-28 DIAGNOSIS — Z12.5 SCREENING FOR PROSTATE CANCER: Primary | ICD-10-CM

## 2022-02-28 DIAGNOSIS — R79.89 LOW TESTOSTERONE LEVEL IN MALE: ICD-10-CM

## 2022-02-28 DIAGNOSIS — N13.8 ENLARGED PROSTATE WITH URINARY OBSTRUCTION: ICD-10-CM

## 2022-02-28 DIAGNOSIS — N40.1 ENLARGED PROSTATE WITH URINARY OBSTRUCTION: ICD-10-CM

## 2022-02-28 DIAGNOSIS — N52.9 IMPOTENCE: ICD-10-CM

## 2022-02-28 LAB
BILIRUB SERPL-MCNC: NORMAL MG/DL
BLOOD URINE, POC: NORMAL
CLARITY, POC UA: CLEAR
COLOR, POC UA: YELLOW
GLUCOSE UR QL STRIP: NORMAL
KETONES UR QL STRIP: NORMAL
LEUKOCYTE ESTERASE URINE, POC: NORMAL
NITRITE, POC UA: NORMAL
PH, POC UA: 7
PROTEIN, POC: NORMAL
SPECIFIC GRAVITY, POC UA: 1.02
UROBILINOGEN, POC UA: 1

## 2022-02-28 PROCEDURE — 81002 URINALYSIS NONAUTO W/O SCOPE: CPT | Mod: S$GLB,,, | Performed by: UROLOGY

## 2022-02-28 PROCEDURE — 3008F PR BODY MASS INDEX (BMI) DOCUMENTED: ICD-10-PCS | Mod: CPTII,S$GLB,, | Performed by: UROLOGY

## 2022-02-28 PROCEDURE — 99999 PR PBB SHADOW E&M-EST. PATIENT-LVL III: ICD-10-PCS | Mod: PBBFAC,,, | Performed by: UROLOGY

## 2022-02-28 PROCEDURE — 81002 POCT URINE DIPSTICK WITHOUT MICROSCOPE: ICD-10-PCS | Mod: S$GLB,,, | Performed by: UROLOGY

## 2022-02-28 PROCEDURE — 99999 PR PBB SHADOW E&M-EST. PATIENT-LVL III: CPT | Mod: PBBFAC,,, | Performed by: UROLOGY

## 2022-02-28 PROCEDURE — 99214 OFFICE O/P EST MOD 30 MIN: CPT | Mod: S$GLB,,, | Performed by: UROLOGY

## 2022-02-28 PROCEDURE — 1160F PR REVIEW ALL MEDS BY PRESCRIBER/CLIN PHARMACIST DOCUMENTED: ICD-10-PCS | Mod: CPTII,S$GLB,, | Performed by: UROLOGY

## 2022-02-28 PROCEDURE — 99214 PR OFFICE/OUTPT VISIT, EST, LEVL IV, 30-39 MIN: ICD-10-PCS | Mod: S$GLB,,, | Performed by: UROLOGY

## 2022-02-28 PROCEDURE — 1160F RVW MEDS BY RX/DR IN RCRD: CPT | Mod: CPTII,S$GLB,, | Performed by: UROLOGY

## 2022-02-28 PROCEDURE — 1159F MED LIST DOCD IN RCRD: CPT | Mod: CPTII,S$GLB,, | Performed by: UROLOGY

## 2022-02-28 PROCEDURE — 3008F BODY MASS INDEX DOCD: CPT | Mod: CPTII,S$GLB,, | Performed by: UROLOGY

## 2022-02-28 PROCEDURE — 1159F PR MEDICATION LIST DOCUMENTED IN MEDICAL RECORD: ICD-10-PCS | Mod: CPTII,S$GLB,, | Performed by: UROLOGY

## 2022-02-28 RX ORDER — TADALAFIL 20 MG/1
20 TABLET ORAL
Qty: 15 TABLET | Refills: 11 | Status: SHIPPED | OUTPATIENT
Start: 2022-02-28 | End: 2023-04-17 | Stop reason: SDUPTHER

## 2022-02-28 NOTE — PROGRESS NOTES
Subjective:       Patient ID: Emily Walls is a 51 y.o. male.    Chief Complaint: Annual Exam    HPI     51 year old with low testosterone.   He is on IM testosterone replacement and his current doses 200 mg every 2 weeks.  He is doing well.  He feels there is increased energy and libido.  Testosterone is  201 but he said he missed his last injection.  No family history of prostate cancer.  PSA is 0.41.  He has mild obstructive urinary symptoms.  He was previously given a prescription for Flomax which was ineffective.  H&H normal.  He also has ED.  He currently takes daily Cialis.  He feels that the 20 mg p.r.n. doses or effective.    Urine dipstick shows negative for all components.    Review of Systems   Constitutional: Negative for fever.   Genitourinary: Negative for dysuria and hematuria.       Objective:      Physical Exam  Vitals reviewed.   Constitutional:       Appearance: He is well-developed.   HENT:      Head: Normocephalic and atraumatic.   Eyes:      Conjunctiva/sclera: Conjunctivae normal.   Cardiovascular:      Rate and Rhythm: Normal rate.   Pulmonary:      Effort: Pulmonary effort is normal.   Genitourinary:     Prostate: Enlarged (30g, s/s/a). Not tender.      Rectum: No mass. Normal anal tone.   Musculoskeletal:         General: Normal range of motion.   Skin:     General: Skin is warm and dry.      Findings: No rash.   Neurological:      Mental Status: He is alert and oriented to person, place, and time.         Assessment:       1. Screening for prostate cancer    2. Low testosterone level in male    3. Impotence    4. Enlarged prostate with urinary obstruction        Plan:       Screening for prostate cancer  -     POCT URINE DIPSTICK WITHOUT MICROSCOPE    Low testosterone level in male    Impotence    Enlarged prostate with urinary obstruction    Other orders  -     tadalafiL (CIALIS) 20 MG Tab; Take 1 tablet (20 mg total) by mouth every 48 hours as needed (ED).  Dispense: 15 tablet;  Refill: 11      continue testosterone at current dose.  Follow-up 1 year with repeat lab

## 2022-05-24 ENCOUNTER — TELEPHONE (OUTPATIENT)
Dept: HEMATOLOGY/ONCOLOGY | Facility: CLINIC | Age: 52
End: 2022-05-24
Payer: COMMERCIAL

## 2022-05-24 NOTE — TELEPHONE ENCOUNTER
----- Message from Candace Boogie sent at 5/24/2022  3:40 PM CDT -----  Type:Needs Medical Advice    Who Called:Sarah (wife))  Best call back number:#[phone]  Additional Info: Requesting a call back regarding#PT has a referral and is ready to schedule 1st appt, please call to schedule  Please Advise- Thank you  .

## 2022-05-24 NOTE — NURSING
Patient contacted regarding referral.  Patient requested 5/31, scheduled as requested. Patient instructed to arrive approx 15 min early, date, time and location.  Patient verbalized understanding.

## 2022-05-31 ENCOUNTER — PATIENT MESSAGE (OUTPATIENT)
Dept: HEMATOLOGY/ONCOLOGY | Facility: CLINIC | Age: 52
End: 2022-05-31
Payer: COMMERCIAL

## 2022-05-31 ENCOUNTER — OFFICE VISIT (OUTPATIENT)
Dept: HEMATOLOGY/ONCOLOGY | Facility: CLINIC | Age: 52
End: 2022-05-31
Payer: COMMERCIAL

## 2022-05-31 VITALS
HEART RATE: 76 BPM | BODY MASS INDEX: 40.9 KG/M2 | SYSTOLIC BLOOD PRESSURE: 112 MMHG | WEIGHT: 260.56 LBS | DIASTOLIC BLOOD PRESSURE: 70 MMHG | OXYGEN SATURATION: 97 % | TEMPERATURE: 97 F | HEIGHT: 67 IN

## 2022-05-31 DIAGNOSIS — L98.9 SKIN LESION OF FACE: ICD-10-CM

## 2022-05-31 DIAGNOSIS — I27.82 OTHER CHRONIC PULMONARY EMBOLISM WITHOUT ACUTE COR PULMONALE: Primary | ICD-10-CM

## 2022-05-31 DIAGNOSIS — D68.59 THROMBOPHILIA: ICD-10-CM

## 2022-05-31 DIAGNOSIS — F41.9 ANXIETY: ICD-10-CM

## 2022-05-31 DIAGNOSIS — I10 HYPERTENSION, UNSPECIFIED TYPE: ICD-10-CM

## 2022-05-31 DIAGNOSIS — M10.9 GOUT, UNSPECIFIED CAUSE, UNSPECIFIED CHRONICITY, UNSPECIFIED SITE: ICD-10-CM

## 2022-05-31 DIAGNOSIS — E78.5 HYPERLIPIDEMIA, UNSPECIFIED HYPERLIPIDEMIA TYPE: ICD-10-CM

## 2022-05-31 PROCEDURE — 3078F PR MOST RECENT DIASTOLIC BLOOD PRESSURE < 80 MM HG: ICD-10-PCS | Mod: CPTII,S$GLB,, | Performed by: INTERNAL MEDICINE

## 2022-05-31 PROCEDURE — 1160F RVW MEDS BY RX/DR IN RCRD: CPT | Mod: CPTII,S$GLB,, | Performed by: INTERNAL MEDICINE

## 2022-05-31 PROCEDURE — 1159F PR MEDICATION LIST DOCUMENTED IN MEDICAL RECORD: ICD-10-PCS | Mod: CPTII,S$GLB,, | Performed by: INTERNAL MEDICINE

## 2022-05-31 PROCEDURE — 1159F MED LIST DOCD IN RCRD: CPT | Mod: CPTII,S$GLB,, | Performed by: INTERNAL MEDICINE

## 2022-05-31 PROCEDURE — 3008F PR BODY MASS INDEX (BMI) DOCUMENTED: ICD-10-PCS | Mod: CPTII,S$GLB,, | Performed by: INTERNAL MEDICINE

## 2022-05-31 PROCEDURE — 3074F PR MOST RECENT SYSTOLIC BLOOD PRESSURE < 130 MM HG: ICD-10-PCS | Mod: CPTII,S$GLB,, | Performed by: INTERNAL MEDICINE

## 2022-05-31 PROCEDURE — 99999 PR PBB SHADOW E&M-EST. PATIENT-LVL V: ICD-10-PCS | Mod: PBBFAC,,, | Performed by: INTERNAL MEDICINE

## 2022-05-31 PROCEDURE — 99205 OFFICE O/P NEW HI 60 MIN: CPT | Mod: S$GLB,,, | Performed by: INTERNAL MEDICINE

## 2022-05-31 PROCEDURE — 3008F BODY MASS INDEX DOCD: CPT | Mod: CPTII,S$GLB,, | Performed by: INTERNAL MEDICINE

## 2022-05-31 PROCEDURE — 99999 PR PBB SHADOW E&M-EST. PATIENT-LVL V: CPT | Mod: PBBFAC,,, | Performed by: INTERNAL MEDICINE

## 2022-05-31 PROCEDURE — 1160F PR REVIEW ALL MEDS BY PRESCRIBER/CLIN PHARMACIST DOCUMENTED: ICD-10-PCS | Mod: CPTII,S$GLB,, | Performed by: INTERNAL MEDICINE

## 2022-05-31 PROCEDURE — 3074F SYST BP LT 130 MM HG: CPT | Mod: CPTII,S$GLB,, | Performed by: INTERNAL MEDICINE

## 2022-05-31 PROCEDURE — 99205 PR OFFICE/OUTPT VISIT, NEW, LEVL V, 60-74 MIN: ICD-10-PCS | Mod: S$GLB,,, | Performed by: INTERNAL MEDICINE

## 2022-05-31 PROCEDURE — 3078F DIAST BP <80 MM HG: CPT | Mod: CPTII,S$GLB,, | Performed by: INTERNAL MEDICINE

## 2022-05-31 RX ORDER — APIXABAN 5 MG/1
5 TABLET, FILM COATED ORAL 2 TIMES DAILY
Qty: 60 TABLET | Refills: 6 | Status: SHIPPED | OUTPATIENT
Start: 2022-05-31 | End: 2023-03-06 | Stop reason: SDUPTHER

## 2022-05-31 NOTE — PROGRESS NOTES
PATIENT: Emily Walls  MRN: [id number]  DATE: 5/31/2022      Diagnosis:   1. Other chronic pulmonary embolism without acute cor pulmonale    2. Thrombophilia    3. Skin lesion of face    4. Hypertension, unspecified type    5. Hyperlipidemia, unspecified hyperlipidemia type    6. Anxiety    7. Gout, unspecified cause, unspecified chronicity, unspecified site        Chief Complaint: Establish Care (Pulmonary Embolism)    Subjective:    Initial History: Mr. Walls is a 52 y.o. male with Anxiety, GERD, Gout, HLD, HTN who presents for diagnosis of pulmonary embolism.  The patient states he woke up on 01/19/2022 and had sudden onset of chest pain at 5:40 a.m..  The patient underwent CTA on 01/19/2022 showing subcentimeter mediastinal, hilar lymphadenopathy; filling defect in the lateral right middle lobe branch vessel; and filling defect in the posterior basal right lower lobe.  The patient was started on Eliquis at that time.  The patient denies any inciting events including injury or recent surgery.  He denies prior history of clots and FHx of clots.  He states he has been on testosterone injections for the past several years.  Currently he endorses arthritis in his knees.  The patient denies CP, cough, SOB, abdominal pain, N/V, constipation, diarrhea.  The patient denies fever, chills, night sweats, weight loss, new lumps or bumps, easy bruising or bleeding.    Past Medical History:   Past Medical History:   Diagnosis Date    Anxiety     GERD (gastroesophageal reflux disease)     Gout     History of chicken pox     HLD (hyperlipidemia)     Hypertension        Past Surgical HIstory:   Past Surgical History:   Procedure Laterality Date    APPENDECTOMY      TONSILLECTOMY         Family History:   Family History   Problem Relation Age of Onset    Heart disease Mother     Hypertension Mother     Hyperlipidemia Mother     Heart disease Father     Hyperlipidemia Father     Hypertension Father        Social  History:  reports that he has never smoked. He has never used smokeless tobacco. He reports current alcohol use of about 12.0 standard drinks of alcohol per week. He reports that he does not use drugs.    Allergies:  Review of patient's allergies indicates:  No Known Allergies    Medications:  Current Outpatient Medications   Medication Sig Dispense Refill    amLODIPine (NORVASC) 5 MG tablet Take 1 tablet (5 mg total) by mouth every evening. 90 tablet 0    atorvastatin (LIPITOR) 20 MG tablet Take 1 tablet (20 mg total) by mouth once daily. 90 tablet 3    bisoproloL-hydrochlorothiazide (ZIAC) 10-6.25 mg per tablet Take 1 tablet by mouth once daily. 30 tablet 6    buPROPion (WELLBUTRIN XL) 300 MG 24 hr tablet Take 300 mg by mouth once daily.      busPIRone (BUSPAR) 15 MG tablet Take 15 mg by mouth Daily.  0    colchicine (COLCRYS) 0.6 mg tablet Take 0.6 mg by mouth daily as needed.      esomeprazole (NEXIUM) 40 MG capsule Take 40 mg by mouth every other day.      LORazepam (ATIVAN) 1 MG tablet Take 1 mg by mouth once daily.  5    multivitamin capsule Take 1 capsule by mouth once daily.      POTASSIUM ORAL Take 40 mg by mouth daily as needed.      tadalafiL (CIALIS) 20 MG Tab Take 1 tablet (20 mg total) by mouth every 48 hours as needed (ED). 15 tablet 11    testosterone cypionate (DEPOTESTOTERONE CYPIONATE) 200 mg/mL injection inject ONE MILLILITER INTRAMUSCULARLY once every 14 days 10 mL 0    aspirin 81 MG Chew Take 1 tablet (81 mg total) by mouth once daily. 30 tablet 0    ELIQUIS 5 mg Tab Take 1 tablet (5 mg total) by mouth 2 (two) times daily. 60 tablet 6     No current facility-administered medications for this visit.       Review of Systems   Constitutional: Negative for appetite change, chills, fever and unexpected weight change.   HENT: Negative for mouth sores, sore throat and trouble swallowing.    Eyes: Negative for photophobia and visual disturbance.   Respiratory: Negative for cough, chest  "tightness and shortness of breath.    Cardiovascular: Negative for chest pain, palpitations and leg swelling.   Gastrointestinal: Negative for abdominal pain, constipation, diarrhea, nausea and vomiting.   Endocrine: Negative for cold intolerance and heat intolerance.   Genitourinary: Negative for difficulty urinating, dysuria, frequency and hematuria.   Musculoskeletal: Positive for arthralgias (knees). Negative for back pain and myalgias.   Skin: Negative for color change and rash.   Neurological: Negative for dizziness, light-headedness, numbness and headaches.   Hematological: Negative for adenopathy. Does not bruise/bleed easily.   Psychiatric/Behavioral: The patient is not nervous/anxious.        ECOG Performance Status: 0   Objective:      Vitals:   Vitals:    05/31/22 1449   BP: 112/70   BP Location: Left arm   Patient Position: Sitting   BP Method: Large (Manual)   Pulse: 76   Temp: 97.4 °F (36.3 °C)   TempSrc: Temporal   SpO2: 97%   Weight: 118.2 kg (260 lb 9.3 oz)   Height: 5' 7" (1.702 m)     Physical Exam  Constitutional:       General: He is not in acute distress.     Appearance: He is well-developed. He is not diaphoretic.   HENT:      Head: Normocephalic and atraumatic.      Comments: Nickel sized discolored lesion on the left lateral scalp near the temple  Eyes:      General: No scleral icterus.        Right eye: No discharge.         Left eye: No discharge.   Cardiovascular:      Rate and Rhythm: Normal rate and regular rhythm.      Heart sounds: Normal heart sounds. No murmur heard.    No friction rub. No gallop.   Pulmonary:      Effort: Pulmonary effort is normal. No respiratory distress.      Breath sounds: Normal breath sounds. No wheezing or rales.   Chest:      Chest wall: No tenderness.   Breasts:      Right: No axillary adenopathy or supraclavicular adenopathy.      Left: No axillary adenopathy or supraclavicular adenopathy.       Abdominal:      General: Bowel sounds are normal. There is " no distension.      Palpations: Abdomen is soft. There is no mass.      Tenderness: There is no abdominal tenderness. There is no rebound.   Musculoskeletal:         General: No tenderness. Normal range of motion.   Lymphadenopathy:      Cervical: No cervical adenopathy.      Upper Body:      Right upper body: No supraclavicular or axillary adenopathy.      Left upper body: No supraclavicular or axillary adenopathy.   Skin:     General: Skin is warm and dry.      Findings: No erythema or rash.   Neurological:      Mental Status: He is alert and oriented to person, place, and time.      Coordination: Coordination normal.   Psychiatric:         Behavior: Behavior normal.         Laboratory Data:  No visits with results within 1 Week(s) from this visit.   Latest known visit with results is:   Office Visit on 02/28/2022   Component Date Value Ref Range Status    Color, UA 02/28/2022 Yellow   Final    pH, UA 02/28/2022 7   Final    WBC, UA 02/28/2022 neg   Final    Nitrite, UA 02/28/2022 neg   Final    Protein, POC 02/28/2022 neg   Final    Glucose, UA 02/28/2022 neg   Final    Ketones, UA 02/28/2022 neg   Final    Urobilinogen, UA 02/28/2022 1.0   Final    Bilirubin, POC 02/28/2022 neg   Final    Blood, UA 02/28/2022 neg   Final    Clarity, UA 02/28/2022 Clear   Final    Spec Grav UA 02/28/2022 1.020   Final         Imaging: CTA Chest 1/19/22    The central airways are patent. No displaced fracture or dislocation is appreciated.  No significant effusions are appreciated.  No pneumothorax, lobar consolidation or cardiac decompensation is appreciated.  There are sub centimeter predominant mediastinal, hilar lymph nodes present.  Small hiatal hernia with slight gastroesophageal fold thickening is appreciated.  There is some minimal patchy dependent subsegmental atelectasis present.  There is some mild hepatic steatosis appearance overall.  There is some minimal fissure thickening.  There is otherwise  unremarkable appearance of the included upper abdominal organs.  Unremarkable aortic contours are demonstrated. No central filling defects are appreciated.  Segmental evaluation is motion, bolus limited.  There does however a filling defect demonstrated at the lateral right middle lobe branch vessel sequence 9 images 41 through 43 as well as posterior basal right lower lobe image 58.  No other significant interval changes are appreciated.    Assessment:       1. Other chronic pulmonary embolism without acute cor pulmonale    2. Thrombophilia    3. Skin lesion of face    4. Hypertension, unspecified type    5. Hyperlipidemia, unspecified hyperlipidemia type    6. Anxiety    7. Gout, unspecified cause, unspecified chronicity, unspecified site           Plan:     Chronic Pulmonary Embolism - The patient was diagnosed with an unpovoked PE on 1/19/22 with clots in the right lung  -Pt instructed that given his male gender I would recommend lifelong anticoagulation  -Pt instructed he would need a colonoscopy as age appropriate cancer screening  -PSA on 2/11/22 was 0.41ng/mL  -Pt to follow up in 3 months with CBC and CMP  -No need for hypercoagulable work up as the patient has not strong FHx of clotting and it would not     Skin Lesion - Pt with brownish colored, round, nickel sized area on the left temple  -Will have the patient seen dermatology for assessment    HTN - Pt on amlodipine, bisoprolol-HCTZ  -Will monitor    HLD - pt on lipitor  -Managemetn per cardiology    Anxiety - pt on buspar and ativan  -Management per PCP    Gout - pt on colchicine as needed  -no signs of active gout    Route Chart for Scheduling    Med Onc Chart Routing      Follow up with physician 3 months. The patient needs a CBC and CMP with a return visit at that time.  He will neeed an urgent appt with dermatology to assess a lesion on his scalp.   Follow up with [facility]    Labs    Imaging    Pharmacy appointment    Other referrals                Shekhar Wen MD  [facility]  Hematology and Oncology  [facility]   [address] [place] [address]   O: [phone]  F: [phone]

## 2022-06-28 ENCOUNTER — OFFICE VISIT (OUTPATIENT)
Dept: ORTHOPEDICS | Facility: CLINIC | Age: 52
End: 2022-06-28
Payer: COMMERCIAL

## 2022-06-28 ENCOUNTER — HOSPITAL ENCOUNTER (OUTPATIENT)
Dept: RADIOLOGY | Facility: HOSPITAL | Age: 52
Discharge: HOME | End: 2022-06-28
Attending: PHYSICIAN ASSISTANT
Payer: COMMERCIAL

## 2022-06-28 DIAGNOSIS — M25.531 RIGHT WRIST PAIN: ICD-10-CM

## 2022-06-28 DIAGNOSIS — M65.831 EXTENSOR TENOSYNOVITIS OF RIGHT WRIST: Primary | ICD-10-CM

## 2022-06-28 DIAGNOSIS — M25.431 SWELLING OF RIGHT WRIST: ICD-10-CM

## 2022-06-28 PROCEDURE — 1159F MED LIST DOCD IN RCRD: CPT | Mod: CPTII,S$GLB,, | Performed by: PHYSICIAN ASSISTANT

## 2022-06-28 PROCEDURE — 99999 PR PBB SHADOW E&M-EST. PATIENT-LVL III: CPT | Mod: PBBFAC,,, | Performed by: PHYSICIAN ASSISTANT

## 2022-06-28 PROCEDURE — 73110 XR WRIST COMPLETE 3 VIEWS RIGHT: ICD-10-PCS | Mod: 26,RT,, | Performed by: RADIOLOGY

## 2022-06-28 PROCEDURE — 99999 PR PBB SHADOW E&M-EST. PATIENT-LVL III: ICD-10-PCS | Mod: PBBFAC,,, | Performed by: PHYSICIAN ASSISTANT

## 2022-06-28 PROCEDURE — 73110 X-RAY EXAM OF WRIST: CPT | Mod: TC,PO,RT

## 2022-06-28 PROCEDURE — 73110 X-RAY EXAM OF WRIST: CPT | Mod: 26,RT,, | Performed by: RADIOLOGY

## 2022-06-28 PROCEDURE — 1160F RVW MEDS BY RX/DR IN RCRD: CPT | Mod: CPTII,S$GLB,, | Performed by: PHYSICIAN ASSISTANT

## 2022-06-28 PROCEDURE — 1159F PR MEDICATION LIST DOCUMENTED IN MEDICAL RECORD: ICD-10-PCS | Mod: CPTII,S$GLB,, | Performed by: PHYSICIAN ASSISTANT

## 2022-06-28 PROCEDURE — 99204 OFFICE O/P NEW MOD 45 MIN: CPT | Mod: S$GLB,,, | Performed by: PHYSICIAN ASSISTANT

## 2022-06-28 PROCEDURE — 1160F PR REVIEW ALL MEDS BY PRESCRIBER/CLIN PHARMACIST DOCUMENTED: ICD-10-PCS | Mod: CPTII,S$GLB,, | Performed by: PHYSICIAN ASSISTANT

## 2022-06-28 PROCEDURE — 99204 PR OFFICE/OUTPT VISIT, NEW, LEVL IV, 45-59 MIN: ICD-10-PCS | Mod: S$GLB,,, | Performed by: PHYSICIAN ASSISTANT

## 2022-06-28 RX ORDER — METHYLPREDNISOLONE 4 MG/1
TABLET ORAL
Qty: 21 EACH | Refills: 0 | Status: SHIPPED | OUTPATIENT
Start: 2022-06-28 | End: 2022-07-19

## 2022-06-28 NOTE — PROGRESS NOTES
6/28/2022    Chief Complaint:  Chief Complaint   Patient presents with    Right Hand - Pain, Swelling       HPI:  Emily Walls is a 52 y.o. male, who presents to clinic today for evaluation of right hand/wrist/distal forearm pain and swelling.  States pain on average is 10/10.  States he has a history of de Quervain tenosynovitis.  States he gets these types of flares once or twice a year.  States he has been having symptoms for approximately 3 weeks.  States he received a stem ache steroid injection 3 weeks ago, which provided significant relief to his symptoms.  States he over works his right arm, and had return of symptoms.  States they now are severe and worse than before.  Denies any acute injuries.  Denies any paresthesias.  States he is currently taking Eliquis.  Denies any other complaints this time.    PMHX:  Past Medical History:   Diagnosis Date    Anxiety     GERD (gastroesophageal reflux disease)     Gout     History of chicken pox     HLD (hyperlipidemia)     Hypertension        PSHX:  Past Surgical History:   Procedure Laterality Date    ABDOMINAL SURGERY      APPENDECTOMY      TONSILLECTOMY         FMHX:  Family History   Problem Relation Age of Onset    Heart disease Mother     Hypertension Mother     Hyperlipidemia Mother     Heart disease Father     Hyperlipidemia Father     Hypertension Father        SOCHX:  Social History     Tobacco Use    Smoking status: Never Smoker    Smokeless tobacco: Never Used   Substance Use Topics    Alcohol use: Not Currently     Alcohol/week: 12.0 standard drinks     Types: 12 Cans of beer per week       ALLERGIES:  Patient has no known allergies.    CURRENT MEDICATIONS:  Current Outpatient Medications on File Prior to Visit   Medication Sig Dispense Refill    atorvastatin (LIPITOR) 20 MG tablet Take 1 tablet (20 mg total) by mouth once daily. 90 tablet 3    bisoproloL-hydrochlorothiazide (ZIAC) 10-6.25 mg per tablet Take 1 tablet by mouth  once daily. 30 tablet 6    busPIRone (BUSPAR) 15 MG tablet Take 15 mg by mouth Daily.  0    colchicine (COLCRYS) 0.6 mg tablet Take 0.6 mg by mouth daily as needed.      ELIQUIS 5 mg Tab Take 1 tablet (5 mg total) by mouth 2 (two) times daily. 60 tablet 6    esomeprazole (NEXIUM) 40 MG capsule Take 40 mg by mouth every other day.      LORazepam (ATIVAN) 1 MG tablet Take 1 mg by mouth once daily.  5    multivitamin capsule Take 1 capsule by mouth once daily.      tadalafiL (CIALIS) 20 MG Tab Take 1 tablet (20 mg total) by mouth every 48 hours as needed (ED). 15 tablet 11    amLODIPine (NORVASC) 5 MG tablet Take 1 tablet (5 mg total) by mouth every evening. (Patient not taking: Reported on 6/28/2022) 90 tablet 0    aspirin 81 MG Chew Take 1 tablet (81 mg total) by mouth once daily. 30 tablet 0    buPROPion (WELLBUTRIN XL) 300 MG 24 hr tablet Take 300 mg by mouth once daily.      POTASSIUM ORAL Take 40 mg by mouth daily as needed.      testosterone cypionate (DEPOTESTOTERONE CYPIONATE) 200 mg/mL injection inject ONE MILLILITER INTRAMUSCULARLY once every 14 days (Patient not taking: Reported on 6/28/2022) 10 mL 0     No current facility-administered medications on file prior to visit.       REVIEW OF SYSTEMS:  Review of Systems   Constitutional: Positive for fever. Negative for diaphoresis.   HENT: Negative for ear pain, hearing loss, nosebleeds and tinnitus.    Eyes: Negative for pain and redness.   Respiratory: Negative for cough and shortness of breath.    Cardiovascular: Negative for chest pain and palpitations.   Gastrointestinal: Negative for blood in stool, constipation, diarrhea, nausea and vomiting.   Genitourinary: Negative for frequency and hematuria.   Musculoskeletal: Positive for myalgias. Negative for back pain.   Skin: Negative for itching and rash.   Neurological: Positive for tingling and weakness. Negative for dizziness, seizures and headaches.   Endo/Heme/Allergies: Negative for  environmental allergies.   Psychiatric/Behavioral: The patient is nervous/anxious.        GENERAL PHYSICAL EXAM:   There were no vitals taken for this visit.   GEN: well developed, well nourished, no acute distress   HENT: Normocephalic, atraumatic   EYES: No discharge, conjunctiva normal   NECK: Supple, non-tender   PULM: No wheezing, no respiratory distress   CV: RRR   ABD: Soft, non-tender    ORTHO EXAM:   Examination of the right arm reveals moderate edema of the distal forearm, wrist, and hand.  No erythema, ecchymosis, fluctuance, drainage, skin breakdown, purulence, or any other signs of infection.  Able to make composite fist and fully extend all fingers.  Range of motion of the right wrist limited due to pain.  Full intact range of motion of the right elbow.  No tenderness to palpation lateral condyle.  Tenderness palpation of the dorsum of the distal forearm.  No tenderness palpation of the volar surface of the forearm or volar surface of the hand.  Tenderness palpation of the dorsum of the hand.  No tenderness to palpation of the fingers.  Tenderness to palpation of the radial and ulnar sides of the wrist.  Strength not tested secondary to pain.  Sensation is grossly intact in the radial, ulnar, and median nerve distributions.  Capillary refill less than 2 seconds in all fingers.    RADIOLOGY:   X-rays of the right wrist were taken today in clinic.  X-rays reviewed by myself.  Imaging showed no acute fracture dislocation.  No subluxation.  Presence of mild degenerative changes the radiocarpal joint.  No osseous destructive/erosive process noted.  No other significant bony abnormalities noted.    ASSESSMENT:   Extensor tenosynovitis of the right wrist/hand    PLAN:  1. I discussed with Emily Walls extensor tenosynovitis pathology and treatment options in detail during today's visit.  After treatment options were discussed, we decided the best course of action this time is to proceed with a course of  a Medrol Dosepak, perform rheumatology labs, and proceed with immobilization via a removable Velcro short wrist splint.  We discussed he is not a candidate for oral prescription NSAIDs due to being on Eliquis.  He verbally agreed with the treatment plan.    2. He was prescribed a Medrol Dosepak in clinic today.  He was instructed to discontinue the medication for any adverse effects.  We discussed for any worsening of his symptoms he should report to nearest emergency department.  He verbalized understanding.    3. He was provided a removable Velcro wrist splint in clinic today.  He was instructed to wear at all times for the next week.  He verbalized understanding.    4. An autoimmune panel was ordered in clinic today.  He was instructed to have the labs drawn prior to beginning his Medrol Dosepak.  He verbalized understanding.    5. He was referred rheumatology in clinic today.    6. We discussed I will contact him over the phone with his lab results and discuss if the Medrol Dosepak is relieving his symptoms.  He was instructed to contact the clinic for any problems or concerns in the interim.    Answers for HPI/ROS submitted by the patient on 6/28/2022  unexpected weight change: No  appetite change : No  sleep disturbance: Yes  IMMUNOCOMPROMISED: No  dysphoric mood: Yes  visual disturbance: No  sinus pressure : No  food allergies: No  difficulty urinating: No  numbness: Yes  joint swelling: Yes  Pain Chronicity: recurrent  History of trauma: No  Onset: 1 to 4 weeks ago  Frequency: constantly  Progression since onset: gradually worsening  Injury mechanism: reaching  injury location: at home  pain- numeric: 9/10  pain location: right wrist, right hand, right fingers  pain quality: aching, dull, hot, sharp, tightness, burning, numbing, shooting, throbbing  Radiating Pain: Yes  If your pain is radiating, to what part of the body?: right forearm, right hand  Aggravating factors: activity, bearing weight, contact,  exercise, extension, flexion, twisting, lifting, lying down, rotation, touching  inability to bear weight: Yes  joint locking: Yes  limited range of motion: Yes  stiffness: Yes  Treatments tried: brace/corset, cold, heat, injection treatment, movement, NSAIDs, OTC ointments, OTC pain meds, rest  physical therapy: effective  Improvement on treatment: mild

## 2022-07-27 ENCOUNTER — TELEPHONE (OUTPATIENT)
Dept: DERMATOLOGY | Facility: CLINIC | Age: 52
End: 2022-07-27
Payer: COMMERCIAL

## 2022-08-09 ENCOUNTER — OFFICE VISIT (OUTPATIENT)
Dept: ORTHOPEDICS | Facility: CLINIC | Age: 52
End: 2022-08-09
Payer: COMMERCIAL

## 2022-08-09 DIAGNOSIS — M65.831 EXTENSOR TENOSYNOVITIS OF RIGHT WRIST: Primary | ICD-10-CM

## 2022-08-09 PROCEDURE — 99999 PR PBB SHADOW E&M-EST. PATIENT-LVL III: ICD-10-PCS | Mod: PBBFAC,,, | Performed by: PHYSICIAN ASSISTANT

## 2022-08-09 PROCEDURE — 1160F PR REVIEW ALL MEDS BY PRESCRIBER/CLIN PHARMACIST DOCUMENTED: ICD-10-PCS | Mod: CPTII,S$GLB,, | Performed by: PHYSICIAN ASSISTANT

## 2022-08-09 PROCEDURE — 1159F MED LIST DOCD IN RCRD: CPT | Mod: CPTII,S$GLB,, | Performed by: PHYSICIAN ASSISTANT

## 2022-08-09 PROCEDURE — 99213 OFFICE O/P EST LOW 20 MIN: CPT | Mod: S$GLB,,, | Performed by: PHYSICIAN ASSISTANT

## 2022-08-09 PROCEDURE — 1159F PR MEDICATION LIST DOCUMENTED IN MEDICAL RECORD: ICD-10-PCS | Mod: CPTII,S$GLB,, | Performed by: PHYSICIAN ASSISTANT

## 2022-08-09 PROCEDURE — 1160F RVW MEDS BY RX/DR IN RCRD: CPT | Mod: CPTII,S$GLB,, | Performed by: PHYSICIAN ASSISTANT

## 2022-08-09 PROCEDURE — 99999 PR PBB SHADOW E&M-EST. PATIENT-LVL III: CPT | Mod: PBBFAC,,, | Performed by: PHYSICIAN ASSISTANT

## 2022-08-09 PROCEDURE — 99213 PR OFFICE/OUTPT VISIT, EST, LEVL III, 20-29 MIN: ICD-10-PCS | Mod: S$GLB,,, | Performed by: PHYSICIAN ASSISTANT

## 2022-08-09 NOTE — PROGRESS NOTES
8/9/2022    HPI:  Emily Walls is a 52 y.o. male, who presents to clinic today for continued evaluation of his right hand/wrist/distal forearm pain and swelling.  States pain on average is 1/10.  States he has significantly improved since taken Medrol Dosepak.  States he has been wearing his removable Velcro splint as instructed.  States he has had limited weight-bearing as instructed.  Denies any acute injuries since his last visit.  Denies any paresthesias.  Denies any other complaints at this time.    PMHX:  Past Medical History:   Diagnosis Date    Anxiety     GERD (gastroesophageal reflux disease)     Gout     History of chicken pox     HLD (hyperlipidemia)     Hypertension        PSHX:  Past Surgical History:   Procedure Laterality Date    ABDOMINAL SURGERY      APPENDECTOMY      TONSILLECTOMY         FMHX:  Family History   Problem Relation Age of Onset    Heart disease Mother     Hypertension Mother     Hyperlipidemia Mother     Heart disease Father     Hyperlipidemia Father     Hypertension Father        SOCHX:  Social History     Tobacco Use    Smoking status: Never Smoker    Smokeless tobacco: Never Used   Substance Use Topics    Alcohol use: Not Currently     Alcohol/week: 12.0 standard drinks     Types: 12 Cans of beer per week       ALLERGIES:  Patient has no known allergies.    CURRENT MEDICATIONS:  Current Outpatient Medications on File Prior to Visit   Medication Sig Dispense Refill    atorvastatin (LIPITOR) 20 MG tablet Take 1 tablet (20 mg total) by mouth once daily. 90 tablet 3    bisoproloL-hydrochlorothiazide (ZIAC) 10-6.25 mg per tablet Take 1 tablet by mouth once daily. 30 tablet 6    busPIRone (BUSPAR) 15 MG tablet Take 15 mg by mouth Daily.  0    colchicine (COLCRYS) 0.6 mg tablet Take 0.6 mg by mouth daily as needed.      ELIQUIS 5 mg Tab Take 1 tablet (5 mg total) by mouth 2 (two) times daily. 60 tablet 6    esomeprazole (NEXIUM) 40 MG capsule Take 40 mg by  mouth every other day.      LORazepam (ATIVAN) 1 MG tablet Take 1 mg by mouth once daily.  5    multivitamin capsule Take 1 capsule by mouth once daily.      POTASSIUM ORAL Take 40 mg by mouth daily as needed.      tadalafiL (CIALIS) 20 MG Tab Take 1 tablet (20 mg total) by mouth every 48 hours as needed (ED). 15 tablet 11    testosterone cypionate (DEPOTESTOTERONE CYPIONATE) 200 mg/mL injection inject ONE MILLILITER INTRAMUSCULARLY once every 14 days 10 mL 0    amLODIPine (NORVASC) 5 MG tablet Take 1 tablet (5 mg total) by mouth every evening. (Patient not taking: No sig reported) 90 tablet 0    aspirin 81 MG Chew Take 1 tablet (81 mg total) by mouth once daily. 30 tablet 0    buPROPion (WELLBUTRIN XL) 300 MG 24 hr tablet Take 300 mg by mouth once daily.       No current facility-administered medications on file prior to visit.       REVIEW OF SYSTEMS:  Review of Systems Complete; Negative, unless noted above.    GENERAL PHYSICAL EXAM:   There were no vitals taken for this visit.   GEN: well developed, well nourished, no acute distress   PULM: No wheezing, no respiratory distress   CV: RRR    ORTHO EXAM:   Examination of the right arm reveals no edema, erythema, ecchymosis, or skin breakdown.  Able to make composite fist and fully extend all fingers.  Full intact range of motion of the right wrist.  Full intact range of motion of the right elbow.  No tenderness to palpation of the lateral epicondyle.  Mild tenderness noted with palpation over the intersection of the 1st and 2nd extensor compartments.  Minimal tenderness with palpation of the 1st extensor compartment.  Minimal tenderness with palpation over the 3rd and 4th extensor compartments.  5/5 /intrinsic strength.  Normal sensation in the radial, ulnar, and median nerve distributions.  Capillary refill less than 2 seconds in all fingers.    RADIOLOGY:   None.    ASSESSMENT:   Right wrist extensor tenosynovitis    PLAN:  1. I discussed with Emily  Maykel Walls is progressing appropriately in the treatment course.  We discussed the best course of action this time is to begin occupational therapy to decrease the tenderness inflammation of the dorsum of his right wrist, and increase the function of his right arm/hand.  He verbally agreed with the treatment plan.    2. He was referred occupational therapy in clinic today.    3. I would like him follow-up in clinic in 4 weeks for repeat evaluation.  He was instructed to contact the clinic for any problems or concerns in the interim.

## 2022-09-02 ENCOUNTER — OFFICE VISIT (OUTPATIENT)
Dept: HEMATOLOGY/ONCOLOGY | Facility: CLINIC | Age: 52
End: 2022-09-02
Payer: COMMERCIAL

## 2022-09-02 ENCOUNTER — LAB VISIT (OUTPATIENT)
Dept: LAB | Facility: HOSPITAL | Age: 52
End: 2022-09-02
Attending: INTERNAL MEDICINE
Payer: COMMERCIAL

## 2022-09-02 VITALS
WEIGHT: 259.69 LBS | BODY MASS INDEX: 40.76 KG/M2 | TEMPERATURE: 97 F | OXYGEN SATURATION: 98 % | HEART RATE: 78 BPM | DIASTOLIC BLOOD PRESSURE: 80 MMHG | HEIGHT: 67 IN | SYSTOLIC BLOOD PRESSURE: 118 MMHG

## 2022-09-02 DIAGNOSIS — E78.5 HYPERLIPIDEMIA, UNSPECIFIED HYPERLIPIDEMIA TYPE: ICD-10-CM

## 2022-09-02 DIAGNOSIS — F41.9 ANXIETY: ICD-10-CM

## 2022-09-02 DIAGNOSIS — I27.82 OTHER CHRONIC PULMONARY EMBOLISM WITHOUT ACUTE COR PULMONALE: ICD-10-CM

## 2022-09-02 DIAGNOSIS — M10.9 GOUT, UNSPECIFIED CAUSE, UNSPECIFIED CHRONICITY, UNSPECIFIED SITE: ICD-10-CM

## 2022-09-02 DIAGNOSIS — I27.82 OTHER CHRONIC PULMONARY EMBOLISM WITHOUT ACUTE COR PULMONALE: Primary | ICD-10-CM

## 2022-09-02 DIAGNOSIS — I10 HYPERTENSION, UNSPECIFIED TYPE: ICD-10-CM

## 2022-09-02 LAB
ALBUMIN SERPL BCP-MCNC: 4 G/DL (ref 3.5–5.2)
ALP SERPL-CCNC: 39 U/L (ref 55–135)
ALT SERPL W/O P-5'-P-CCNC: 79 U/L (ref 10–44)
ANION GAP SERPL CALC-SCNC: 11 MMOL/L (ref 8–16)
AST SERPL-CCNC: 49 U/L (ref 10–40)
BASOPHILS # BLD AUTO: 0.05 K/UL (ref 0–0.2)
BASOPHILS NFR BLD: 0.8 % (ref 0–1.9)
BILIRUB SERPL-MCNC: 1.3 MG/DL (ref 0.1–1)
BUN SERPL-MCNC: 16 MG/DL (ref 6–20)
CALCIUM SERPL-MCNC: 9.5 MG/DL (ref 8.7–10.5)
CHLORIDE SERPL-SCNC: 109 MMOL/L (ref 95–110)
CO2 SERPL-SCNC: 22 MMOL/L (ref 23–29)
CREAT SERPL-MCNC: 1.2 MG/DL (ref 0.5–1.4)
DIFFERENTIAL METHOD: ABNORMAL
EOSINOPHIL # BLD AUTO: 0.3 K/UL (ref 0–0.5)
EOSINOPHIL NFR BLD: 4.5 % (ref 0–8)
ERYTHROCYTE [DISTWIDTH] IN BLOOD BY AUTOMATED COUNT: 11.2 % (ref 11.5–14.5)
EST. GFR  (NO RACE VARIABLE): >60 ML/MIN/1.73 M^2
GLUCOSE SERPL-MCNC: 92 MG/DL (ref 70–110)
HCT VFR BLD AUTO: 41.5 % (ref 40–54)
HGB BLD-MCNC: 14.7 G/DL (ref 14–18)
IMM GRANULOCYTES # BLD AUTO: 0.02 K/UL (ref 0–0.04)
IMM GRANULOCYTES NFR BLD AUTO: 0.3 % (ref 0–0.5)
LYMPHOCYTES # BLD AUTO: 1.8 K/UL (ref 1–4.8)
LYMPHOCYTES NFR BLD: 27.7 % (ref 18–48)
MCH RBC QN AUTO: 32.2 PG (ref 27–31)
MCHC RBC AUTO-ENTMCNC: 35.4 G/DL (ref 32–36)
MCV RBC AUTO: 91 FL (ref 82–98)
MONOCYTES # BLD AUTO: 0.5 K/UL (ref 0.3–1)
MONOCYTES NFR BLD: 8 % (ref 4–15)
NEUTROPHILS # BLD AUTO: 3.9 K/UL (ref 1.8–7.7)
NEUTROPHILS NFR BLD: 58.7 % (ref 38–73)
NRBC BLD-RTO: 0 /100 WBC
PLATELET # BLD AUTO: 252 K/UL (ref 150–450)
PMV BLD AUTO: 8.9 FL (ref 9.2–12.9)
POTASSIUM SERPL-SCNC: 3.8 MMOL/L (ref 3.5–5.1)
PROT SERPL-MCNC: 8 G/DL (ref 6–8.4)
RBC # BLD AUTO: 4.57 M/UL (ref 4.6–6.2)
SODIUM SERPL-SCNC: 142 MMOL/L (ref 136–145)
WBC # BLD AUTO: 6.6 K/UL (ref 3.9–12.7)

## 2022-09-02 PROCEDURE — 36415 COLL VENOUS BLD VENIPUNCTURE: CPT | Mod: PN | Performed by: INTERNAL MEDICINE

## 2022-09-02 PROCEDURE — 3074F PR MOST RECENT SYSTOLIC BLOOD PRESSURE < 130 MM HG: ICD-10-PCS | Mod: CPTII,S$GLB,, | Performed by: INTERNAL MEDICINE

## 2022-09-02 PROCEDURE — 1159F PR MEDICATION LIST DOCUMENTED IN MEDICAL RECORD: ICD-10-PCS | Mod: CPTII,S$GLB,, | Performed by: INTERNAL MEDICINE

## 2022-09-02 PROCEDURE — 80053 COMPREHEN METABOLIC PANEL: CPT | Mod: PN | Performed by: INTERNAL MEDICINE

## 2022-09-02 PROCEDURE — 99999 PR PBB SHADOW E&M-EST. PATIENT-LVL III: CPT | Mod: PBBFAC,,, | Performed by: INTERNAL MEDICINE

## 2022-09-02 PROCEDURE — 3074F SYST BP LT 130 MM HG: CPT | Mod: CPTII,S$GLB,, | Performed by: INTERNAL MEDICINE

## 2022-09-02 PROCEDURE — 99999 PR PBB SHADOW E&M-EST. PATIENT-LVL III: ICD-10-PCS | Mod: PBBFAC,,, | Performed by: INTERNAL MEDICINE

## 2022-09-02 PROCEDURE — 85025 COMPLETE CBC W/AUTO DIFF WBC: CPT | Mod: PN | Performed by: INTERNAL MEDICINE

## 2022-09-02 PROCEDURE — 3079F PR MOST RECENT DIASTOLIC BLOOD PRESSURE 80-89 MM HG: ICD-10-PCS | Mod: CPTII,S$GLB,, | Performed by: INTERNAL MEDICINE

## 2022-09-02 PROCEDURE — 3008F BODY MASS INDEX DOCD: CPT | Mod: CPTII,S$GLB,, | Performed by: INTERNAL MEDICINE

## 2022-09-02 PROCEDURE — 99213 OFFICE O/P EST LOW 20 MIN: CPT | Mod: S$GLB,,, | Performed by: INTERNAL MEDICINE

## 2022-09-02 PROCEDURE — 3079F DIAST BP 80-89 MM HG: CPT | Mod: CPTII,S$GLB,, | Performed by: INTERNAL MEDICINE

## 2022-09-02 PROCEDURE — 3008F PR BODY MASS INDEX (BMI) DOCUMENTED: ICD-10-PCS | Mod: CPTII,S$GLB,, | Performed by: INTERNAL MEDICINE

## 2022-09-02 PROCEDURE — 1159F MED LIST DOCD IN RCRD: CPT | Mod: CPTII,S$GLB,, | Performed by: INTERNAL MEDICINE

## 2022-09-02 PROCEDURE — 99213 PR OFFICE/OUTPT VISIT, EST, LEVL III, 20-29 MIN: ICD-10-PCS | Mod: S$GLB,,, | Performed by: INTERNAL MEDICINE

## 2022-09-02 NOTE — PROGRESS NOTES
PATIENT: Emily Walls  MRN: [id number]  DATE: 9/2/2022      Diagnosis:   1. Other chronic pulmonary embolism without acute cor pulmonale    2. Hypertension, unspecified type    3. Hyperlipidemia, unspecified hyperlipidemia type    4. Anxiety    5. Gout, unspecified cause, unspecified chronicity, unspecified site          Chief Complaint: Follow-up (3 month follow up; chronic pulmonary embolism )    Subjective:    Interval History:Mr. Walls is a 52 y.o. male with Anxiety, GERD, Gout, HLD, HTN who presents for diagnosis of pulmonary embolism.  Since the last clinic visit the patient has had dermatologist Dr. Russell and had an atypical nevus removed from his back.  The patient denies CP, cough, SOB, abdominal pain, N/V, constipation, diarrhea.  The patient denies fever, chills, night sweats, weight loss, new lumps or bumps, easy bruising or bleeding.    Prior History:  The patient states he woke up on 01/19/2022 and had sudden onset of chest pain at 5:40 a.m..  The patient underwent CTA on 01/19/2022 showing subcentimeter mediastinal, hilar lymphadenopathy; filling defect in the lateral right middle lobe branch vessel; and filling defect in the posterior basal right lower lobe.  The patient was started on Eliquis at that time.    Past Medical History:   Past Medical History:   Diagnosis Date    Anxiety     GERD (gastroesophageal reflux disease)     Gout     History of chicken pox     HLD (hyperlipidemia)     Hypertension        Past Surgical HIstory:   Past Surgical History:   Procedure Laterality Date    ABDOMINAL SURGERY      APPENDECTOMY      TONSILLECTOMY         Family History:   Family History   Problem Relation Age of Onset    Heart disease Mother     Hypertension Mother     Hyperlipidemia Mother     Heart disease Father     Hyperlipidemia Father     Hypertension Father        Social History:  reports that he has never smoked. He has never used smokeless tobacco. He reports that he does not currently use  alcohol after a past usage of about 12.0 standard drinks per week. He reports that he does not use drugs.    Allergies:  Review of patient's allergies indicates:  No Known Allergies    Medications:  Current Outpatient Medications   Medication Sig Dispense Refill    atorvastatin (LIPITOR) 20 MG tablet Take 1 tablet (20 mg total) by mouth once daily. 90 tablet 3    bisoproloL-hydrochlorothiazide (ZIAC) 10-6.25 mg per tablet TAKE 1 TABLET BY MOUTH EVERY DAY 30 tablet 6    busPIRone (BUSPAR) 15 MG tablet Take 15 mg by mouth Daily.  0    colchicine (COLCRYS) 0.6 mg tablet Take 0.6 mg by mouth daily as needed.      ELIQUIS 5 mg Tab Take 1 tablet (5 mg total) by mouth 2 (two) times daily. 60 tablet 6    esomeprazole (NEXIUM) 40 MG capsule Take 40 mg by mouth every other day.      LORazepam (ATIVAN) 1 MG tablet Take 1 mg by mouth once daily.  5    multivitamin capsule Take 1 capsule by mouth once daily.      tadalafiL (CIALIS) 20 MG Tab Take 1 tablet (20 mg total) by mouth every 48 hours as needed (ED). 15 tablet 11    aspirin 81 MG Chew Take 1 tablet (81 mg total) by mouth once daily. 30 tablet 0     No current facility-administered medications for this visit.       Review of Systems   Constitutional:  Negative for chills, diaphoresis, fatigue, fever and unexpected weight change.   Respiratory:  Negative for cough and shortness of breath.    Cardiovascular:  Negative for chest pain and palpitations.   Gastrointestinal:  Negative for abdominal pain, constipation, diarrhea, nausea and vomiting.   Skin:  Negative for color change and rash.   Neurological:  Negative for headaches.   Hematological:  Negative for adenopathy. Does not bruise/bleed easily.     ECOG Performance Status: 0   Objective:      Vitals:   Vitals:    09/02/22 1359   BP: 118/80   BP Location: Right arm   Patient Position: Sitting   BP Method: Large (Manual)   Pulse: 78   Temp: 97.1 °F (36.2 °C)   TempSrc: Temporal   SpO2: 98%   Weight: 117.8 kg (259 lb 11.2  "oz)   Height: 5' 7" (1.702 m)     Physical Exam  Constitutional:       General: He is not in acute distress.     Appearance: He is well-developed. He is not diaphoretic.   HENT:      Head: Normocephalic and atraumatic.   Cardiovascular:      Rate and Rhythm: Normal rate and regular rhythm.      Heart sounds: Normal heart sounds. No murmur heard.    No friction rub. No gallop.   Pulmonary:      Effort: Pulmonary effort is normal. No respiratory distress.      Breath sounds: Normal breath sounds. No wheezing or rales.   Chest:      Chest wall: No tenderness.   Abdominal:      General: Bowel sounds are normal. There is no distension.      Palpations: Abdomen is soft. There is no mass.      Tenderness: There is no abdominal tenderness. There is no rebound.   Musculoskeletal:      Cervical back: Normal range of motion.   Lymphadenopathy:      Cervical: No cervical adenopathy.      Upper Body:      Right upper body: No supraclavicular or axillary adenopathy.      Left upper body: No supraclavicular or axillary adenopathy.   Skin:     General: Skin is warm and dry.      Findings: No erythema or rash.   Neurological:      Mental Status: He is alert and oriented to person, place, and time.   Psychiatric:         Behavior: Behavior normal.       Laboratory Data:  Lab Visit on 09/02/2022   Component Date Value Ref Range Status    WBC 09/02/2022 6.60  3.90 - 12.70 K/uL Final    RBC 09/02/2022 4.57 (L)  4.60 - 6.20 M/uL Final    Hemoglobin 09/02/2022 14.7  14.0 - 18.0 g/dL Final    Hematocrit 09/02/2022 41.5  40.0 - 54.0 % Final    MCV 09/02/2022 91  82 - 98 fL Final    MCH 09/02/2022 32.2 (H)  27.0 - 31.0 pg Final    MCHC 09/02/2022 35.4  32.0 - 36.0 g/dL Final    RDW 09/02/2022 11.2 (L)  11.5 - 14.5 % Final    Platelets 09/02/2022 252  150 - 450 K/uL Final    MPV 09/02/2022 8.9 (L)  9.2 - 12.9 fL Final    Immature Granulocytes 09/02/2022 0.3  0.0 - 0.5 % Final    Gran # (ANC) 09/02/2022 3.9  1.8 - 7.7 K/uL Final    Immature " Grans (Abs) 09/02/2022 0.02  0.00 - 0.04 K/uL Final    Comment: Mild elevation in immature granulocytes is non specific and   can be seen in a variety of conditions including stress response,   acute inflammation, trauma and pregnancy. Correlation with other   laboratory and clinical findings is essential.      Lymph # 09/02/2022 1.8  1.0 - 4.8 K/uL Final    Mono # 09/02/2022 0.5  0.3 - 1.0 K/uL Final    Eos # 09/02/2022 0.3  0.0 - 0.5 K/uL Final    Baso # 09/02/2022 0.05  0.00 - 0.20 K/uL Final    nRBC 09/02/2022 0  0 /100 WBC Final    Gran % 09/02/2022 58.7  38.0 - 73.0 % Final    Lymph % 09/02/2022 27.7  18.0 - 48.0 % Final    Mono % 09/02/2022 8.0  4.0 - 15.0 % Final    Eosinophil % 09/02/2022 4.5  0.0 - 8.0 % Final    Basophil % 09/02/2022 0.8  0.0 - 1.9 % Final    Differential Method 09/02/2022 Automated   Final    Sodium 09/02/2022 142  136 - 145 mmol/L Final    Potassium 09/02/2022 3.8  3.5 - 5.1 mmol/L Final    Chloride 09/02/2022 109  95 - 110 mmol/L Final    CO2 09/02/2022 22 (L)  23 - 29 mmol/L Final    Glucose 09/02/2022 92  70 - 110 mg/dL Final    BUN 09/02/2022 16  6 - 20 mg/dL Final    Creatinine 09/02/2022 1.2  0.5 - 1.4 mg/dL Final    Calcium 09/02/2022 9.5  8.7 - 10.5 mg/dL Final    Total Protein 09/02/2022 8.0  6.0 - 8.4 g/dL Final    Albumin 09/02/2022 4.0  3.5 - 5.2 g/dL Final    Total Bilirubin 09/02/2022 1.3 (H)  0.1 - 1.0 mg/dL Final    Comment: For infants and newborns, interpretation of results should be based  on gestational age, weight and in agreement with clinical  observations.    Premature Infant recommended reference ranges:  Up to 24 hours.............<8.0 mg/dL  Up to 48 hours............<12.0 mg/dL  3-5 days..................<15.0 mg/dL  6-29 days.................<15.0 mg/dL      Alkaline Phosphatase 09/02/2022 39 (L)  55 - 135 U/L Final    AST 09/02/2022 49 (H)  10 - 40 U/L Final    ALT 09/02/2022 79 (H)  10 - 44 U/L Final    Anion Gap 09/02/2022 11  8 - 16 mmol/L Final    eGFR  09/02/2022 >60.0  >60 mL/min/1.73 m^2 Final         Imaging: CTA Chest 1/19/22    The central airways are patent. No displaced fracture or dislocation is appreciated.  No significant effusions are appreciated.  No pneumothorax, lobar consolidation or cardiac decompensation is appreciated.  There are sub centimeter predominant mediastinal, hilar lymph nodes present.  Small hiatal hernia with slight gastroesophageal fold thickening is appreciated.  There is some minimal patchy dependent subsegmental atelectasis present.  There is some mild hepatic steatosis appearance overall.  There is some minimal fissure thickening.  There is otherwise unremarkable appearance of the included upper abdominal organs.  Unremarkable aortic contours are demonstrated. No central filling defects are appreciated.  Segmental evaluation is motion, bolus limited.  There does however a filling defect demonstrated at the lateral right middle lobe branch vessel sequence 9 images 41 through 43 as well as posterior basal right lower lobe image 58.  No other significant interval changes are appreciated.    Assessment:       1. Other chronic pulmonary embolism without acute cor pulmonale    2. Hypertension, unspecified type    3. Hyperlipidemia, unspecified hyperlipidemia type    4. Anxiety    5. Gout, unspecified cause, unspecified chronicity, unspecified site             Plan:     Chronic Pulmonary Embolism - The patient was diagnosed with an unpovoked PE on 1/19/22 with clots in the right lung  -Given his male gender I would recommend lifelong anticoagulation  -Pt instructed he would need a colonoscopy as age appropriate cancer screening  -PSA on 2/11/22 was 0.41ng/mL  -No need for hypercoagulable work up as the patient has not strong FHx of clotting and it would not   -Pt to follow up in 6 months    HTN - Pt on bisoprolol-HCTZ  -Will monitor    HLD - pt on lipitor  -Managemetn per cardiology    Anxiety - pt on buspar and  ativan  -Management per PCP    Gout - pt on colchicine as needed  -no signs of active gout    Route Chart for Scheduling    Med Onc Chart Routing      Follow up with physician 6 months. The patient needs a CBC and CMP with a return visit in 6 months.   Follow up with ELEANOR    Infusion scheduling note    Injection scheduling note    Labs    Imaging    Pharmacy appointment    Other referrals             Shekhar Wen MD  [facility]  Hematology and Oncology  [facility]   [address]   [place] [address]   O: [phone]  F: [phone]

## 2022-09-14 ENCOUNTER — CLINICAL SUPPORT (OUTPATIENT)
Dept: REHABILITATION | Facility: HOSPITAL | Age: 52
End: 2022-09-14
Payer: COMMERCIAL

## 2022-09-14 DIAGNOSIS — M65.831 EXTENSOR TENOSYNOVITIS OF RIGHT WRIST: ICD-10-CM

## 2022-09-14 DIAGNOSIS — M25.531 PAIN, WRIST, RIGHT: ICD-10-CM

## 2022-09-14 DIAGNOSIS — R29.898 RIGHT HAND WEAKNESS: ICD-10-CM

## 2022-09-14 DIAGNOSIS — M25.631 STIFFNESS OF RIGHT WRIST JOINT: ICD-10-CM

## 2022-09-14 PROCEDURE — 97166 OT EVAL MOD COMPLEX 45 MIN: CPT | Mod: PO

## 2022-09-14 PROCEDURE — 97110 THERAPEUTIC EXERCISES: CPT | Mod: PO

## 2022-09-15 NOTE — PLAN OF CARE
[facility] Occupational Therapy  Initial Evaluation     Date: 9/14/2022  Patient: Emily Walls  Chart Number: [id number]    Treatment Diagnosis:   1. Extensor tenosynovitis of right wrist  Ambulatory referral/consult to Physical/Occupational Therapy      2. Stiffness of right wrist joint        3. Right hand weakness        4. Pain, wrist, right            Physician: Jonathan Donahue PA-C    Physician Orders:   Note     Patient is approximately 6 weeks status post severe flare of extensor tenosynovitis of the right wrist/hand.  Patient took a Medrol Dosepak, which has significantly resolved his symptoms.  Patient still has some mild tenderness and pain.  Patient requires occupational therapy to decrease inflammation and pain of his right forearm, wrist, and hand.     Duration:  6 weeks     Frequency:  2-3 times per week     Please work on gentle range of motion, stretching, edema control, and therapeutic modalities as tolerated.  Thanks!          Medical Diagnosis:   M65.831 (ICD-10-CM) - Extensor tenosynovitis of right wrist    Evaluation Date: 9/14/2022  Insurance Authorization period Expiration:  Calendar year  Plan of Care Expiration Period: 10/25/2022  Next Re-assessment: 30 days (10/14/2022) and/or 10th visit  Date of Return Referring Provider TBD    Visit # / Visits Authortized: 1 / TBD  # No shows 0 / # Cancellations: 0  Time In:1415  Time Out: 1500  Total Billable Time: 45 minutes    Precautions: Standard, HTN, gout  Surgery: N/A     S/P: Chronic    Subjective     Involved Side: Right  Dominant Side: Right  Date of Onset: Some Dequervain's 5 yrs ago on R    Mechanism of Injury/ History of Current Condition: Dequervain's 5 yrs ago on R. Pt reports reaction to steroid injection approx 3 yrs ago with worsening of symptoms, pain wrist to elbow which improved some with therapy. Pt with some recurring pain just proximal to 1st dorsal compartment.    Other Surgical/PMHX involved UE:  None  reported    Imaging: X rays:   Last X ray from: 6/28/2022  FINDINGS:  Joint space narrowing is seen at the 1st metatarsophalangeal joint.  There is mild negative ulnar variance.  There is no evidence of acute fracture subluxation about the right wrist.       Previous Therapy:  None reported    Patient's Goals for Therapy:  To get RUE back to normal.    Pain:  Functional Pain Scale Rating 0-10:   4/10 on average  0/10 at best  7/10 at worst  Location: Distal forearm   Description: Aching, Sharp, and Variable,   Aggravating Factors: tight grasp,using weed eater at work  Easing Factors: rest    Occupation:    Title:  [employer]  Former work status: Full  Current work status: Full  Physical demand: medium / repetitive      Functional Limitations/Social History:    Previous functional status includes: Independent with all ADLs/IADLs.    Current FunctionalStatus   Home/Living environment : Pt lives at home.    Limitation of Functional Status as follows:   ADLs/IADLs: Min to Mod overall difficulty reported with basic ADL/IADL tasks.               Pt reports the most difficulty with tool weedeater use at work.   see FOTO results for further related to UE function.    Past Medical History/Physical Systems Review:   Emily Walls  has a past medical history of Anxiety, GERD (gastroesophageal reflux disease), Gout, History of chicken pox, HLD (hyperlipidemia), and Hypertension.    Emily Walls  has a past surgical history that includes Appendectomy; Tonsillectomy; and Abdominal surgery.    Emily has a current medication list which includes the following prescription(s): aspirin, atorvastatin, bisoprolol-hydrochlorothiazide, buspirone, colchicine, eliquis, esomeprazole, lorazepam, multivitamin, and tadalafil.    Review of patient's allergies indicates:  No Known Allergies       Objective     CMS Impairment/Limitation/Restriction for FOTO Wrist Survey    Therapist reviewed FOTO scores for Emily Walls on  9/14/2022.   FOTO documents entered into EPIC - see Media section.    Limitation Score: 74%      Observation/Inspection/Wound/Incision/Scar   Mild to moderate edema, non pitting. All skin intact  RUE    Sensation: Grossly WNL,    Edema:          Circumferential (in cm) L R   Proximal Wrist Crease 18.5 19.0   MPs NT NT   Mid P1 of  Long Finger NT NT      AROM Hand: Tip to palm/DPC digits 1-5 (in cm)   Left Right   1st -1.5 -1.5   2nd Intact 2-5 Intact 2-5   3rd     4th     5th       AROM Wrist/Forearm:               Left              Right   Wrist Ext/Flex:  75/66° Ext/Flex:  60/55°        Forearm Pron/Sup  WNL° Pro/Sup:  WNL°     AROM Elbow:                Left              Right   Elbow Ext/Flex  WNL° Ext/Flex:  WNL°         Manual Muscle Test: NT                                         Strength (in pounds, psi's):   Left Right     100                         Special and/or Standardized Tests:  NT      Treatment     Treatment Time In: 1448  Treatment Time Out: 1500  Total Treatment time separate from Evaluation time:12 minutes.    Emily received therapeutic exercises for 9 minutes including: Initial Home Exercise Program Instruction.  Weighted wrist stretches with 1# wt (dumbell or small can/water bottle) 10 x10 sec sec holds down with gravity with relaxed/loose  as tolerated 2-3.    Education ice massage: with demo and handout  3 min    Home Exercise Program/Education:  Issued HEP (see patient instructions in EMR) and educated on modality use for pain management . Exercises were reviewed and Emily was able to demonstrate them prior to the end of the session.   Pt received a written copy of exercises to perform at home. Emily demonstrated good  understanding of the education provided.  Pt was advised to perform these exercises free of pain, and to stop performing them if pain occurs.    Patient/Family Education: role of OT, goals for OT, scheduling/cancellations - pt verbalized understanding.      Additional Education provided: N/A    Assessment     Emily Walls is a 52 y.o. male referred to outpatient occupational/hand therapy and presents with a medical diagnosis of - Extensor tenosynovitis of right wrist resulting in, pain, edema, decreased A/PROM, strength, and functional use of involved upper extremity/extremities) and demonstrates limitations as described in the chart below.     The patient's rehab potential is good for the goals listed below.    No anticipated barriers to occupational therapy.  Pt has no cultural, educational or language barriers to learning provided.    Profile and History Assessment of Occupational Performance Level of Clinical Decision Making Complexity Score   Occupational Profile:   Emily Walls is a 52 y.o. male who was Independent with all ADL/IADL prior to onset of symptoms/injury . Pt is currently  reporting Min to Mod difficulty with RUE use affecting his daily functional abilities. His main goal for therapy is regain Independent use of RUE.    Comorbidities:    has a past medical history of Anxiety, GERD (gastroesophageal reflux disease), Gout, History of chicken pox, HLD (hyperlipidemia), and Hypertension.  Medical and Therapy History Review:   Expanded               Performance Deficits    Physical:  Joint Mobility  Muscle Power/Strength  Muscle Endurance  Edema   Strength  Pain    Cognitive:  No Deficits    Psychosocial:    No Deficits      Clinical Decision Making:  moderate    Assessment Process:  Detailed Assessments    Modification/Need for Assistance:  Minimal-Moderate Modifications/Assistance    Intervention Selection:  Several Treatment Options       moderate  Based on PMHX, co morbidities , data from assessments and functional level of assistance required with task and clinical presentation directly impacting function.       The following goals were discussed with the patient and patient is in agreement with them as to be addressed in the  treatment plan.     Goals:     Short Term Goals: (30 days, 10/14/2022, and/or 10th visit) unless otherwise noted below.  1. Pt will be independent with HEP in 2 visits.  2. Pt will report decreased pain to a 5/10 at worst in RUE with ADLs in order to increase function/use of UE.   3. Pt will increase AROM wrist extension by 10 degrees (to 65 degrees) to increase function for ADL/IADL.  4. Pt will increase AROM wrist flexion by 10 degrees (to 70 degrees) to increase function for ADL/IADL.  5. Pt will demo increased R hand  strength of 20# (to 120#) in order to increase function with grasp during ADL/IADL tasks (cooking utensils, tool use, carrying groceries, steering wheel, etc.)     Long Term Goals: (by discharge)  1. Pt will report decreased pain to 1-2/10 with ADLs to allow for increased function/use of UE.   2. Pt will exhibit grossly WNL AROM of  R Wrist to allow for Independent use of for all ADL/IADL tasks.  3. Pt will exhibit WNL  strength to allow a firm grasp during ADL/IADL (cooking utensils, tool use, carrying groceries, steering wheel, etc.)  4. Pt will return to PLOF with all ADL/IADL, leisure and job tasks.    Plan   Plan of care expiration 10/25/2022    Outpatient Occupational Therapy 1-3 times weekly (pt reports he may only be able to attend 1 x weekly) through current poc expiration date, to include the following interventions:     Moist heat, cold packs, paraffin, fluidotherapy, US, edema control, scar mobilization/scar massage, manual therapy/joint mobilizations,A/PROM, therapeutic exercises/activities, strengthening, desensitization/sensory re-education, orthotic fitting/fabrication/training  PRN, joint protections/energry conservation/adaptive equipment/activity modification HEP/education as well as any other treatments deemed necessary based on the patient's needs or progress.     Pt may be discharged prior to poc expiration date if all goals/desired outcome met or if max rehabilitation  potential has been achieved.    Updates Next Visit: To review HEP understanding and compliance and progress with OT tx as tolerated.    RAVINDRA Sumner, DEVINT

## 2022-09-20 ENCOUNTER — CLINICAL SUPPORT (OUTPATIENT)
Dept: REHABILITATION | Facility: HOSPITAL | Age: 52
End: 2022-09-20
Payer: COMMERCIAL

## 2022-09-20 DIAGNOSIS — M25.631 STIFFNESS OF RIGHT WRIST JOINT: Primary | ICD-10-CM

## 2022-09-20 DIAGNOSIS — R29.898 RIGHT HAND WEAKNESS: ICD-10-CM

## 2022-09-20 DIAGNOSIS — M25.531 PAIN, WRIST, RIGHT: ICD-10-CM

## 2022-09-20 PROCEDURE — 97110 THERAPEUTIC EXERCISES: CPT | Mod: PO

## 2022-09-20 PROCEDURE — 97140 MANUAL THERAPY 1/> REGIONS: CPT | Mod: PO

## 2022-09-20 PROCEDURE — 97530 THERAPEUTIC ACTIVITIES: CPT | Mod: PO

## 2022-09-20 NOTE — PROGRESS NOTES
Occupational Therapy Daily Treatment Note     Name: Emily Walls  Clinic Number: [id number]    Therapy Diagnosis:   Encounter Diagnoses   Name Primary?    Stiffness of right wrist joint Yes    Right hand weakness     Pain, wrist, right      Physician: Jonathan Donahue PA-C    Physician orders:   Note     Patient is approximately 6 weeks status post severe flare of extensor tenosynovitis of the right wrist/hand.  Patient took a Medrol Dosepak, which has significantly resolved his symptoms.  Patient still has some mild tenderness and pain.  Patient requires occupational therapy to decrease inflammation and pain of his right forearm, wrist, and hand.     Duration:  6 weeks     Frequency:  2-3 times per week     Please work on gentle range of motion, stretching, edema control, and therapeutic modalities as tolerated.  Thanks!           Visit Date: 9/20/2022    Medical Diagnosis: Medical Diagnosis:   M65.831 (ICD-10-CM) - Extensor tenosynovitis of right wrist     Evaluation Date: 9/14/2022  Insurance Authorization period Expiration:  Calendar year  Plan of Care Expiration Period: 10/25/2022  Next Re-assessment: 30 days (10/14/2022) and/or 10th visit  Date of Return Referring Provider TBD     Visit # / Visits Authortized: 2 / 12  # No shows 0 / # Cancellations: 0  Time In:1345  Time Out: 1428  Total Billable Time: 38 minutes     Precautions: Standard, HTN, gout  Surgery: N/A                            S/P: Chronic    Subjective     Pt reports: No new symptoms or complaints  he was compliant with HEP.   Response to previous treatment:Good  Functional change: None reported    Pain:  Functional Pain Scale Rating 0-10:   2/10 on average  0/10 at best  2/10 at worst lately  Location: Distal forearm   Description: Aching, and Variable,   Aggravating Factors: tight grasp,using weed eater at work  Easing Factors: rest    Objective   MEASUREMENTS   Last measurement (LM) from [facility] unless otherwise noted below.  FOTO from Eval  (wrist) = 74%    Edema:            Circumferential (in cm) L R   Proximal Wrist Crease 18.5 19.0   MPs NT NT   Mid P1 of  Long Finger NT NT      AROM Hand: Tip to palm/DPC digits 1-5 (in cm)    Left Right   1st -1.5 -1.5   2nd Intact 2-5 Intact 2-5   3rd       4th       5th          AROM Wrist/Forearm: 9/20/2022                Left              Right   Wrist Ext/Flex:  75/66° Ext/Flex:  62 (+2)/55 65° (+10)           Forearm Pron/Sup  WNL° Pro/Sup:  WNL°      AROM Elbow:                 Left              Right   Elbow Ext/Flex  WNL° Ext/Flex:  WNL°         Manual Muscle Test: NT                                          Strength (in pounds, psi's):    Left Right     100                               TREATMENT  Emily received the following supervised modalities after being cleared for contradictions Moist heat for 3 minutes:     Emily received the following direct contact modalities after being cleared for contraindications for 0 minutes:  0 min.     Emily received the following manual therapy techniques for 8 minutes:   -STM and Retrograde massage for R wrist/forearm     Emily received therapeutic exercises for 15 minutes including:  - Weighted wrist stretches with 2# wt (dumbell or small can/water bottle) 10 x10 sec sec holds down with gravity with relaxed/loose    - UD 2# DB 3x10    Emily participated in dynamic functional therapeutic activities to improve functional performance for 15  minutes, including:  - Roll wrist wheel forward 2 min and back 2 min  - Yellow flexbar palm up palm down 3x10 each    Home Exercises and Education Provided     Education provided:   -  Cont per previous.    Written Home Exercises Provided:  Patient instructed to cont prior HEP.    Exercises were reviewed and Emily was able to demonstrate them prior to the end of the session.  Emily demonstrated good  understanding of the education provided.     See EMR under Media for exercises provided today and/or prior visit.         Assessment   Emily Walls is a 52 y.o. male referred to outpatient occupational/hand therapy and presents with a medical diagnosis of - Extensor tenosynovitis of right wrist resulting in, pain, edema, decreased A/PROM, strength, and functional use of involved upper extremity/extremities)     Pt would continue to benefit from skilled OT. Pt tolerated progression with Tx well this date. Cont per current POC.       - Progress towards goals:  STG #1 has been met.    Emily is progressing well towards his goals . Pt continues with good rehab potential.     Pt will continue to benefit from skilled outpatient occupational therapy to address the deficits listed in the problem list on initial evaluation in order to maximize pt's level of independence in the home and community.     Anticipated barriers to occupational therapy: None    Pt's spiritual, cultural and educational needs considered and pt agreeable to plan of care and goals.    Goals:     Short Term Goals: (30 days, 10/14/2022, and/or 10th visit) unless otherwise noted below.  1. Pt will be independent with HEP in 2 visits.  2. Pt will report decreased pain to a 5/10 at worst in RUE with ADLs in order to increase function/use of UE.   3. Pt will increase AROM wrist extension by 10 degrees (to 65 degrees) to increase function for ADL/IADL.  4. Pt will increase AROM wrist flexion by 10 degrees (to 70 degrees) to increase function for ADL/IADL.  5. Pt will demo increased R hand  strength of 20# (to 120#) in order to increase function with grasp during ADL/IADL tasks (cooking utensils, tool use, carrying groceries, steering wheel, etc.)      Long Term Goals: (by discharge)  1. Pt will report decreased pain to 1-2/10 with ADLs to allow for increased function/use of UE.   2. Pt will exhibit grossly WNL AROM of  R Wrist to allow for Independent use of for all ADL/IADL tasks.  3. Pt will exhibit WNL  strength to allow a firm grasp during ADL/IADL (cooking  utensils, tool use, carrying groceries, steering wheel, etc.)  4. Pt will return to PLOF with all ADL/IADL, leisure and job tasks.       Plan   Continue Occupational Therapy 1-3 times per week through current poc expiration date of 10/25/2022, in order to to decrease pain and edema, and increase A/PROM, strength, and functional use of  upper extremity.    Updates/Grading for next session:  Progress with OT as tolerated.      RAVINDRA Sumner, DEVINT

## 2022-09-21 DIAGNOSIS — M25.561 RIGHT KNEE PAIN, UNSPECIFIED CHRONICITY: Primary | ICD-10-CM

## 2022-09-27 ENCOUNTER — OFFICE VISIT (OUTPATIENT)
Dept: ORTHOPEDICS | Facility: CLINIC | Age: 52
End: 2022-09-27
Payer: COMMERCIAL

## 2022-09-27 VITALS — BODY MASS INDEX: 39.24 KG/M2 | WEIGHT: 250 LBS | HEIGHT: 67 IN

## 2022-09-27 DIAGNOSIS — M25.561 ACUTE PAIN OF RIGHT KNEE: ICD-10-CM

## 2022-09-27 DIAGNOSIS — M25.561: Primary | ICD-10-CM

## 2022-09-27 PROCEDURE — 99204 OFFICE O/P NEW MOD 45 MIN: CPT | Mod: S$GLB,,, | Performed by: NURSE PRACTITIONER

## 2022-09-27 PROCEDURE — 3008F PR BODY MASS INDEX (BMI) DOCUMENTED: ICD-10-PCS | Mod: CPTII,S$GLB,, | Performed by: NURSE PRACTITIONER

## 2022-09-27 PROCEDURE — 1160F PR REVIEW ALL MEDS BY PRESCRIBER/CLIN PHARMACIST DOCUMENTED: ICD-10-PCS | Mod: CPTII,S$GLB,, | Performed by: NURSE PRACTITIONER

## 2022-09-27 PROCEDURE — 1160F RVW MEDS BY RX/DR IN RCRD: CPT | Mod: CPTII,S$GLB,, | Performed by: NURSE PRACTITIONER

## 2022-09-27 PROCEDURE — 1159F MED LIST DOCD IN RCRD: CPT | Mod: CPTII,S$GLB,, | Performed by: NURSE PRACTITIONER

## 2022-09-27 PROCEDURE — 99999 PR PBB SHADOW E&M-EST. PATIENT-LVL III: ICD-10-PCS | Mod: PBBFAC,,, | Performed by: NURSE PRACTITIONER

## 2022-09-27 PROCEDURE — 1159F PR MEDICATION LIST DOCUMENTED IN MEDICAL RECORD: ICD-10-PCS | Mod: CPTII,S$GLB,, | Performed by: NURSE PRACTITIONER

## 2022-09-27 PROCEDURE — 3008F BODY MASS INDEX DOCD: CPT | Mod: CPTII,S$GLB,, | Performed by: NURSE PRACTITIONER

## 2022-09-27 PROCEDURE — 99204 PR OFFICE/OUTPT VISIT, NEW, LEVL IV, 45-59 MIN: ICD-10-PCS | Mod: S$GLB,,, | Performed by: NURSE PRACTITIONER

## 2022-09-27 PROCEDURE — 99999 PR PBB SHADOW E&M-EST. PATIENT-LVL III: CPT | Mod: PBBFAC,,, | Performed by: NURSE PRACTITIONER

## 2022-09-27 RX ORDER — DOXYCYCLINE HYCLATE 100 MG
100 TABLET ORAL EVERY 12 HOURS
Qty: 20 TABLET | Refills: 0 | Status: SHIPPED | OUTPATIENT
Start: 2022-09-27 | End: 2022-10-07

## 2022-09-27 NOTE — PROGRESS NOTES
Chief Complaint   Patient presents with    Right Knee - Pain       HPI:    This is a 52 y.o. who presents today complaining of right anterior knee pain for 1 weeks after no known injury or trauma. Pain is aching and dull and he has severe point TTP. Pain is present with any activity involving the knee. Reports hard to stand up after sitting. +pain in patella.      Past Medical History:   Diagnosis Date    Anxiety     GERD (gastroesophageal reflux disease)     Gout     History of chicken pox     HLD (hyperlipidemia)     Hypertension       Past Surgical History:   Procedure Laterality Date    ABDOMINAL SURGERY      APPENDECTOMY      TONSILLECTOMY        Current Outpatient Medications on File Prior to Visit   Medication Sig Dispense Refill    atorvastatin (LIPITOR) 20 MG tablet Take 1 tablet (20 mg total) by mouth once daily. 90 tablet 3    bisoproloL-hydrochlorothiazide (ZIAC) 10-6.25 mg per tablet TAKE 1 TABLET BY MOUTH EVERY DAY 30 tablet 6    busPIRone (BUSPAR) 15 MG tablet Take 15 mg by mouth Daily.  0    colchicine (COLCRYS) 0.6 mg tablet Take 0.6 mg by mouth daily as needed.      ELIQUIS 5 mg Tab Take 1 tablet (5 mg total) by mouth 2 (two) times daily. 60 tablet 6    esomeprazole (NEXIUM) 40 MG capsule Take 40 mg by mouth every other day.      LORazepam (ATIVAN) 1 MG tablet Take 1 mg by mouth once daily.  5    methocarbamoL (ROBAXIN) 500 MG Tab Take 1 tablet (500 mg total) by mouth 4 (four) times daily. for 10 days 40 tablet 0    multivitamin capsule Take 1 capsule by mouth once daily.      oxaprozin (DAYPRO) 600 mg tablet Take 2 tablets (1,200 mg total) by mouth once daily. 20 tablet 0    tadalafiL (CIALIS) 20 MG Tab Take 1 tablet (20 mg total) by mouth every 48 hours as needed (ED). 15 tablet 11    aspirin 81 MG Chew Take 1 tablet (81 mg total) by mouth once daily. 30 tablet 0     No current facility-administered medications on file prior to visit.      Review of patient's allergies indicates:  No Known  Allergies   Family History not pertinent   Social History     Socioeconomic History    Marital status:    Tobacco Use    Smoking status: Never    Smokeless tobacco: Never   Substance and Sexual Activity    Alcohol use: Not Currently     Alcohol/week: 12.0 standard drinks     Types: 12 Cans of beer per week    Drug use: No    Sexual activity: Yes     Partners: Female         Review of Systems:   Constitutional:  Denies fever or chills    Eyes:  Denies change in visual acuity    HENT:  Denies nasal congestion or sore throat    Respiratory:  Denies cough or shortness of breath    Cardiovascular:  Denies chest pain or edema    GI:  Denies abdominal pain, nausea, vomiting, bloody stools or diarrhea    :  Denies dysuria    Integument:  Denies rash    Neurologic:  Denies headache, focal weakness or sensory changes    Endocrine:  Denies polyuria or polydipsia    Lymphatic:  Denies swollen glands    Psychiatric:  Denies depression or anxiety       Physical Exam:    Constitutional:  Well developed, well nourished, no acute distress, non-toxic appearance    Integument:  Well hydrated  Neurologic:  Alert & oriented x 3  Psychiatric:  Speech and behavior appropriate      Bilateral Knee Exam    Right Knee Exam   Tenderness   The patient is experiencing severe point tenderness to palpation to anterior patella. Withdraws leg to palpation and grimacing noted.     Range of Motion   Flexion: abnormal     Muscle Strength   The patient has normal left knee strength.    Tests   Heriberto:  Medial - positive   Lachman:  Anterior - negative      Varus: negative  Valgus: negative  Patellar Apprehension: negative      Other   Erythema: absent  Sensation: normal  Pulse: present  Swelling: mild    Left Knee exam   Knee exam performed same as contralateral side and is normal        X-rays were performed 5 days previously, personally reviewed by me and findings discussed with the patient.   3 views of the right knee show no acute  findings. No significant arthritis.                Tenderness of right patella  -     MRI Knee Without Contrast Right; Future; Expected date: 09/27/2022    Acute pain of right knee  -     MRI Knee Without Contrast Right; Future; Expected date: 09/27/2022    Other orders  -     doxycycline (VIBRA-TABS) 100 MG tablet; Take 1 tablet (100 mg total) by mouth every 12 (twelve) hours. for 10 days  Dispense: 20 tablet; Refill: 0      Patient was seen and examined with Dr. Merritt.   MD wants MRI to rule out any significant patellar abnormality involving the patella itself and/or quadricep tendon that would require any type of surgical intervention.     Return to clinic on Tuesday or Thursday after MRI is done to review findings with MD René.     Answers submitted by the patient for this visit:  Orthopedics Questionnaire (Submitted on 9/27/2022)  unexpected weight change: No  appetite change : No  sleep disturbance: No  IMMUNOCOMPROMISED: No  nervous/ anxious: Yes  dysphoric mood: No  rash: No  visual disturbance: No  eye redness: No  eye pain: No  ear pain: No  tinnitus: No  hearing loss: No  sinus pressure : No  nosebleeds: No  enviro allergies: No  food allergies: No  cough: No  shortness of breath: No  sweating: No  frequency: No  difficulty urinating: No  hematuria: No  chest pain: No  palpitations: No  nausea: No  vomiting: No  diarrhea: No  blood in stool: No  constipation: No  headaches: No  dizziness: No  seizures: No  joint swelling: Yes  myalgia: Yes  weakness: Yes  back pain: No   (Submitted on 9/27/2022)  Chief Complaint: Leg pain  Pain Chronicity: new  History of trauma: No  Onset: 1 to 4 weeks ago  Frequency: constantly  Progression since onset: gradually improving  injury location: at home  pain- numeric: 4/10  pain location: right knee  pain quality: sharp, tightness, shooting, throbbing  Radiating Pain: No  Aggravating factors: activity, bending, bearing weight, contact, exercise, extension, standing,  flexion, twisting, walking, lifting, lying down, rotation, sitting, touching  fever: No  inability to bear weight: Yes  itching: Yes  joint locking: Yes  limited range of motion: Yes  stiffness: Yes  tingling: No  Treatments tried: brace/corset, cold, heat, NSAIDs, oral narcotics, OTC pain meds, rest  physical therapy: not tried  Improvement on treatment: mild

## 2022-09-29 ENCOUNTER — TELEPHONE (OUTPATIENT)
Dept: ORTHOPEDICS | Facility: CLINIC | Age: 52
End: 2022-09-29
Payer: COMMERCIAL

## 2022-09-29 DIAGNOSIS — M25.561 ACUTE PAIN OF RIGHT KNEE: Primary | ICD-10-CM

## 2022-09-29 DIAGNOSIS — M25.561: ICD-10-CM

## 2022-09-29 RX ORDER — METHOCARBAMOL 750 MG/1
1500 TABLET, FILM COATED ORAL 3 TIMES DAILY PRN
Qty: 40 TABLET | Refills: 2 | Status: SHIPPED | OUTPATIENT
Start: 2022-09-29 | End: 2022-10-29

## 2022-09-29 RX ORDER — OXYCODONE AND ACETAMINOPHEN 7.5; 325 MG/1; MG/1
1 TABLET ORAL EVERY 6 HOURS PRN
Qty: 28 TABLET | Refills: 0 | Status: SHIPPED | OUTPATIENT
Start: 2022-09-29 | End: 2022-10-06

## 2022-09-29 NOTE — TELEPHONE ENCOUNTER
Called patient and answered all questions and concerns. Having more pain . Norco not helping. See new orders.   Scheduled to come in Tuesday post MRI on my schedule but will follow up with MD René.    ----- Message from Arsalan Khanna sent at 9/29/2022  8:36 AM CDT -----  Regarding: pt called  Name of Who is Calling: DAVID REDD [[id number]]      What is the request in detail: pt called wants to know if there anything else he needs to do for his pain he is on antibotic and its not improving.Please advise      Can the clinic reply by [facility]: No      What Number to Call Back [facility] not [facility][phone]

## 2022-10-04 ENCOUNTER — OFFICE VISIT (OUTPATIENT)
Dept: ORTHOPEDICS | Facility: CLINIC | Age: 52
End: 2022-10-04
Payer: COMMERCIAL

## 2022-10-04 DIAGNOSIS — M70.41 PREPATELLAR BURSITIS OF RIGHT KNEE: Primary | ICD-10-CM

## 2022-10-04 DIAGNOSIS — M25.561 ACUTE PAIN OF RIGHT KNEE: ICD-10-CM

## 2022-10-04 PROCEDURE — 99999 PR PBB SHADOW E&M-EST. PATIENT-LVL III: ICD-10-PCS | Mod: PBBFAC,,, | Performed by: NURSE PRACTITIONER

## 2022-10-04 PROCEDURE — 99999 PR PBB SHADOW E&M-EST. PATIENT-LVL III: CPT | Mod: PBBFAC,,, | Performed by: NURSE PRACTITIONER

## 2022-10-04 PROCEDURE — 99214 OFFICE O/P EST MOD 30 MIN: CPT | Mod: S$GLB,,, | Performed by: NURSE PRACTITIONER

## 2022-10-04 PROCEDURE — 1160F RVW MEDS BY RX/DR IN RCRD: CPT | Mod: CPTII,S$GLB,, | Performed by: NURSE PRACTITIONER

## 2022-10-04 PROCEDURE — 1160F PR REVIEW ALL MEDS BY PRESCRIBER/CLIN PHARMACIST DOCUMENTED: ICD-10-PCS | Mod: CPTII,S$GLB,, | Performed by: NURSE PRACTITIONER

## 2022-10-04 PROCEDURE — 1159F MED LIST DOCD IN RCRD: CPT | Mod: CPTII,S$GLB,, | Performed by: NURSE PRACTITIONER

## 2022-10-04 PROCEDURE — 1159F PR MEDICATION LIST DOCUMENTED IN MEDICAL RECORD: ICD-10-PCS | Mod: CPTII,S$GLB,, | Performed by: NURSE PRACTITIONER

## 2022-10-04 PROCEDURE — 99214 PR OFFICE/OUTPT VISIT, EST, LEVL IV, 30-39 MIN: ICD-10-PCS | Mod: S$GLB,,, | Performed by: NURSE PRACTITIONER

## 2022-10-06 NOTE — PROGRESS NOTES
Chief Complaint   Patient presents with    Right Knee - Pain     MRI results       HPI:   This is a 52 y.o. who returns to clinic today in follow-up for right knee for the past 3 weeks after no known injury or trauma. Pain is dull and present with flexion or the knee. Has severe TTP to anterior patella that has improved some since previous encounter. Was placed on doxycycline last visit for 10 days for prepatellar bursitis. Is here today to review MRI results. No numbness or tingling. No associated signs or symptoms.    Past Medical History:   Diagnosis Date    Anxiety     GERD (gastroesophageal reflux disease)     Gout     History of chicken pox     HLD (hyperlipidemia)     Hypertension      Past Surgical History:   Procedure Laterality Date    ABDOMINAL SURGERY      APPENDECTOMY      TONSILLECTOMY       Current Outpatient Medications on File Prior to Visit   Medication Sig Dispense Refill    atorvastatin (LIPITOR) 20 MG tablet Take 1 tablet (20 mg total) by mouth once daily. 90 tablet 3    bisoproloL-hydrochlorothiazide (ZIAC) 10-6.25 mg per tablet TAKE 1 TABLET BY MOUTH EVERY DAY 30 tablet 6    busPIRone (BUSPAR) 15 MG tablet Take 15 mg by mouth Daily.  0    colchicine (COLCRYS) 0.6 mg tablet Take 0.6 mg by mouth daily as needed.      doxycycline (VIBRA-TABS) 100 MG tablet Take 1 tablet (100 mg total) by mouth every 12 (twelve) hours. for 10 days 20 tablet 0    ELIQUIS 5 mg Tab Take 1 tablet (5 mg total) by mouth 2 (two) times daily. 60 tablet 6    esomeprazole (NEXIUM) 40 MG capsule Take 40 mg by mouth every other day.      LORazepam (ATIVAN) 1 MG tablet Take 1 mg by mouth once daily.  5    methocarbamoL (ROBAXIN) 750 MG Tab Take 2 tablets (1,500 mg total) by mouth 3 (three) times daily as needed (muscular spasms). 40 tablet 2    multivitamin capsule Take 1 capsule by mouth once daily.      oxaprozin (DAYPRO) 600 mg tablet Take 2 tablets (1,200 mg total) by mouth once daily. 20 tablet 0     oxyCODONE-acetaminophen (PERCOCET) 7.5-325 mg per tablet Take 1 tablet by mouth every 6 (six) hours as needed for Pain. 28 tablet 0    tadalafiL (CIALIS) 20 MG Tab Take 1 tablet (20 mg total) by mouth every 48 hours as needed (ED). 15 tablet 11    aspirin 81 MG Chew Take 1 tablet (81 mg total) by mouth once daily. 30 tablet 0     No current facility-administered medications on file prior to visit.     Review of patient's allergies indicates:  No Known Allergies  Family History   Problem Relation Age of Onset    Heart disease Mother     Hypertension Mother     Hyperlipidemia Mother     Heart disease Father     Hyperlipidemia Father     Hypertension Father      Social History     Socioeconomic History    Marital status:    Tobacco Use    Smoking status: Never    Smokeless tobacco: Never   Substance and Sexual Activity    Alcohol use: Not Currently     Alcohol/week: 12.0 standard drinks     Types: 12 Cans of beer per week    Drug use: No    Sexual activity: Yes     Partners: Female       Review of Systems:  Constitutional:  Denies fever or chills   Eyes:  Denies change in visual acuity   HENT:  Denies nasal congestion or sore throat   Respiratory:  Denies cough or shortness of breath   Cardiovascular:  Denies chest pain or edema   GI:  Denies abdominal pain, nausea, vomiting, bloody stools or diarrhea   :  Denies dysuria   Integument:  Denies rash   Neurologic:  Denies headache, focal weakness or sensory changes   Endocrine:  Denies polyuria or polydipsia   Lymphatic:  Denies swollen glands   Psychiatric:  Denies depression or anxiety     Physical Exam:   Constitutional:  Well developed, well nourished, no acute distress, non-toxic appearance   Integument:  Well hydrated, no rash   Lymphatic:  No lymphadenopathy noted   Neurologic:  Alert & oriented x 3,    Psychiatric:  Speech and behavior appropriate   Gi: abdomen soft  Eyes: EOMI    Bilateral Knee Exam    right Knee Exam   Tenderness   The patient is  experiencing tenderness in the medial joint line.    Range of Motion   Flexion: abnormal     Muscle Strength   The patient has normal left knee strength.    Tests   Heriberto:  Medial - positive   Lachman:  Anterior - negative      Varus: negative  Valgus: negative  Patellar Apprehension: negative      Other   Erythema: absent  Sensation: normal  Pulse: present  Swelling: mild    left Knee exam   Knee exam performed same as contralateral side and is normal                    Prepatellar bursitis of right knee    Acute pain of right knee     Reviewed MRI results with patient per Dr. Merritt.   Plan is to see him back once off antibiotics, and discuss possible injection to knee with steroid if anterior pain has improved. If not, then will give bactrim course for prepatellar bursitis.   Continue to Apply a compressive ACE bandage. Rest and elevate the affected painful area.  Apply cold compresses intermittently as needed.  As pain recedes, begin normal activities slowly as tolerated.  Call if symptoms persist.    Return to clinic as scheduled.

## 2022-10-11 ENCOUNTER — OFFICE VISIT (OUTPATIENT)
Dept: ORTHOPEDICS | Facility: CLINIC | Age: 52
End: 2022-10-11
Payer: COMMERCIAL

## 2022-10-11 VITALS — BODY MASS INDEX: 39.24 KG/M2 | WEIGHT: 250 LBS | HEIGHT: 67 IN

## 2022-10-11 DIAGNOSIS — M17.11 PRIMARY OSTEOARTHRITIS OF RIGHT KNEE: Primary | ICD-10-CM

## 2022-10-11 DIAGNOSIS — S86.811D STRAIN OF PATELLAR TENDON, RIGHT, SUBSEQUENT ENCOUNTER: ICD-10-CM

## 2022-10-11 PROCEDURE — 1159F PR MEDICATION LIST DOCUMENTED IN MEDICAL RECORD: ICD-10-PCS | Mod: CPTII,S$GLB,, | Performed by: NURSE PRACTITIONER

## 2022-10-11 PROCEDURE — 1160F RVW MEDS BY RX/DR IN RCRD: CPT | Mod: CPTII,S$GLB,, | Performed by: NURSE PRACTITIONER

## 2022-10-11 PROCEDURE — 99999 PR PBB SHADOW E&M-EST. PATIENT-LVL III: ICD-10-PCS | Mod: PBBFAC,,, | Performed by: NURSE PRACTITIONER

## 2022-10-11 PROCEDURE — 99214 PR OFFICE/OUTPT VISIT, EST, LEVL IV, 30-39 MIN: ICD-10-PCS | Mod: 25,S$GLB,, | Performed by: NURSE PRACTITIONER

## 2022-10-11 PROCEDURE — 1160F PR REVIEW ALL MEDS BY PRESCRIBER/CLIN PHARMACIST DOCUMENTED: ICD-10-PCS | Mod: CPTII,S$GLB,, | Performed by: NURSE PRACTITIONER

## 2022-10-11 PROCEDURE — 20610 LARGE JOINT ASPIRATION/INJECTION: R KNEE: ICD-10-PCS | Mod: RT,S$GLB,, | Performed by: NURSE PRACTITIONER

## 2022-10-11 PROCEDURE — 20610 DRAIN/INJ JOINT/BURSA W/O US: CPT | Mod: RT,S$GLB,, | Performed by: NURSE PRACTITIONER

## 2022-10-11 PROCEDURE — 1159F MED LIST DOCD IN RCRD: CPT | Mod: CPTII,S$GLB,, | Performed by: NURSE PRACTITIONER

## 2022-10-11 PROCEDURE — 99999 PR PBB SHADOW E&M-EST. PATIENT-LVL III: CPT | Mod: PBBFAC,,, | Performed by: NURSE PRACTITIONER

## 2022-10-11 PROCEDURE — 99214 OFFICE O/P EST MOD 30 MIN: CPT | Mod: 25,S$GLB,, | Performed by: NURSE PRACTITIONER

## 2022-10-11 RX ADMIN — TRIAMCINOLONE ACETONIDE 40 MG: 40 INJECTION, SUSPENSION INTRA-ARTICULAR; INTRAMUSCULAR at 11:10

## 2022-10-11 NOTE — PROGRESS NOTES
Chief Complaint   Patient presents with    Right Knee - Pain       HPI:   This is a 52 y.o. who returns to clinic today in follow-up for right knee for the past 3 weeks after no known injury; he has completed entire course of abxs and pain is much better. Patellar region much less tender, however still having significant pain with ambulating. Pain is dull. No numbness or tingling. We previously discussed steroid injection with Dr. Merritt and patient is agreeable.     Past Medical History:   Diagnosis Date    Anxiety     GERD (gastroesophageal reflux disease)     Gout     History of chicken pox     HLD (hyperlipidemia)     Hypertension      Past Surgical History:   Procedure Laterality Date    ABDOMINAL SURGERY      APPENDECTOMY      TONSILLECTOMY       Current Outpatient Medications on File Prior to Visit   Medication Sig Dispense Refill    aspirin 81 MG Chew Take 1 tablet (81 mg total) by mouth once daily. 30 tablet 0    atorvastatin (LIPITOR) 20 MG tablet Take 1 tablet (20 mg total) by mouth once daily. 90 tablet 3    bisoproloL-hydrochlorothiazide (ZIAC) 10-6.25 mg per tablet TAKE 1 TABLET BY MOUTH EVERY DAY 30 tablet 6    busPIRone (BUSPAR) 15 MG tablet Take 15 mg by mouth Daily.  0    colchicine (COLCRYS) 0.6 mg tablet Take 0.6 mg by mouth daily as needed.      ELIQUIS 5 mg Tab Take 1 tablet (5 mg total) by mouth 2 (two) times daily. 60 tablet 6    esomeprazole (NEXIUM) 40 MG capsule Take 40 mg by mouth every other day.      LORazepam (ATIVAN) 1 MG tablet Take 1 mg by mouth once daily.  5    methocarbamoL (ROBAXIN) 750 MG Tab Take 2 tablets (1,500 mg total) by mouth 3 (three) times daily as needed (muscular spasms). 40 tablet 2    multivitamin capsule Take 1 capsule by mouth once daily.      oxaprozin (DAYPRO) 600 mg tablet Take 2 tablets (1,200 mg total) by mouth once daily. 20 tablet 0    tadalafiL (CIALIS) 20 MG Tab Take 1 tablet (20 mg total) by mouth every 48 hours as needed (ED). 15 tablet 11     No  current facility-administered medications on file prior to visit.     Review of patient's allergies indicates:  No Known Allergies  Family History   Problem Relation Age of Onset    Heart disease Mother     Hypertension Mother     Hyperlipidemia Mother     Heart disease Father     Hyperlipidemia Father     Hypertension Father      Social History     Socioeconomic History    Marital status:    Tobacco Use    Smoking status: Never    Smokeless tobacco: Never   Substance and Sexual Activity    Alcohol use: Not Currently     Alcohol/week: 12.0 standard drinks     Types: 12 Cans of beer per week    Drug use: No    Sexual activity: Yes     Partners: Female       Review of Systems:  Constitutional:  Denies fever or chills   Eyes:  Denies change in visual acuity   HENT:  Denies nasal congestion or sore throat   Respiratory:  Denies cough or shortness of breath   Cardiovascular:  Denies chest pain or edema   GI:  Denies abdominal pain, nausea, vomiting, bloody stools or diarrhea   :  Denies dysuria   Integument:  Denies rash   Neurologic:  Denies headache, focal weakness or sensory changes   Endocrine:  Denies polyuria or polydipsia   Lymphatic:  Denies swollen glands   Psychiatric:  Denies depression or anxiety     Physical Exam:   Constitutional:  Well developed, well nourished, no acute distress, non-toxic appearance   Integument:  Well hydrated.   Neurologic:  Alert & oriented x 3  Psychiatric:  Speech and behavior appropriate       Bilateral Knee Exam    Right Knee Exam   Tenderness   The patient is experiencing tenderness in the medial joint line.    Range of Motion   Flexion: abnormal     Muscle Strength   The patient has normal bilateral knee strength.    Tests   Heriberto:  Medial - positive   Lachman:  Anterior - negative      Varus: negative  Valgus: negative  Patellar Apprehension: negative      Other   Erythema: absent  Sensation: normal  Pulse: present  Swelling: mild    Left Knee exam   Knee exam  performed same as contralateral side and is normal          Primary osteoarthritis of right knee  -     Large Joint Aspiration/Injection: R knee  -     Ambulatory referral/consult to Physical/Occupational Therapy; Future; Expected date: 10/18/2022    Strain of patellar tendon, right, subsequent encounter  -     Ambulatory referral/consult to Physical/Occupational Therapy; Future; Expected date: 10/18/2022     Using an aseptic technique, I injected 5 cc of lidocaine 1% without and 1 cc of kenalog 40mg into the right Knee. The patient tolerated this well.     Bioskin brace.     RTC in 4-6 weeks.

## 2022-10-11 NOTE — PROCEDURES
Large Joint Aspiration/Injection: R knee    Date/Time: 10/11/2022 11:00 AM  Performed by: ARMIN Ayon  Authorized by: ARMIN Ayon     Consent Done?:  Yes (Verbal)  Indications:  Pain  Timeout: prior to procedure the correct patient, procedure, and site was verified    Prep: patient was prepped and draped in usual sterile fashion    Local anesthetic:  Lidocaine 1% without epinephrine  Anesthetic total (ml):  5      Details:  Needle Size:  21 G  Approach:  Anterolateral  Location:  Knee  Site:  R knee  Medications:  40 mg triamcinolone acetonide 40 mg/mL  Patient tolerance:  Patient tolerated the procedure well with no immediate complications

## 2022-10-12 RX ORDER — TRIAMCINOLONE ACETONIDE 40 MG/ML
40 INJECTION, SUSPENSION INTRA-ARTICULAR; INTRAMUSCULAR
Status: DISCONTINUED | OUTPATIENT
Start: 2022-10-11 | End: 2022-10-12 | Stop reason: HOSPADM

## 2022-10-25 ENCOUNTER — PATIENT MESSAGE (OUTPATIENT)
Dept: ORTHOPEDICS | Facility: CLINIC | Age: 52
End: 2022-10-25
Payer: COMMERCIAL

## 2022-10-25 PROBLEM — R07.89 ATYPICAL CHEST PAIN: Status: RESOLVED | Noted: 2022-01-17 | Resolved: 2022-10-25

## 2022-10-25 PROBLEM — Z82.49 FAMILY HISTORY OF PREMATURE CAD: Status: ACTIVE | Noted: 2022-10-25

## 2022-11-25 ENCOUNTER — TELEPHONE (OUTPATIENT)
Dept: HEMATOLOGY/ONCOLOGY | Facility: CLINIC | Age: 52
End: 2022-11-25
Payer: COMMERCIAL

## 2022-11-30 ENCOUNTER — PATIENT MESSAGE (OUTPATIENT)
Dept: ORTHOPEDICS | Facility: CLINIC | Age: 52
End: 2022-11-30
Payer: COMMERCIAL

## 2022-11-30 RX ORDER — DOXYCYCLINE 100 MG/1
100 CAPSULE ORAL 2 TIMES DAILY
Qty: 20 CAPSULE | Refills: 0 | Status: SHIPPED | OUTPATIENT
Start: 2022-11-30 | End: 2022-12-10

## 2022-12-09 ENCOUNTER — PATIENT MESSAGE (OUTPATIENT)
Dept: HEMATOLOGY/ONCOLOGY | Facility: CLINIC | Age: 52
End: 2022-12-09
Payer: COMMERCIAL

## 2023-03-05 NOTE — PROGRESS NOTES
PATIENT: Emily Walls  MRN: [id number]  DATE: 3/6/2023      Diagnosis:   1. Other chronic pulmonary embolism without acute cor pulmonale    2. Thrombophilia    3. Hypertension, unspecified type    4. Hyperlipidemia, unspecified hyperlipidemia type    5. Anxiety    6. Gout, unspecified cause, unspecified chronicity, unspecified site            Chief Complaint: Follow-up (chronic pulmonary embolism )      Subjective:    Interval History:Mr. Walls is a 52 y.o. male with Anxiety, GERD, Gout, HLD, HTN who presents for diagnosis of pulmonary embolism.  Since the last clinic visit the patient states he has felt well.  The patient denies CP, cough, SOB, abdominal pain, N/V, constipation, diarrhea.  The patient denies fever, chills, night sweats, weight loss, new lumps or bumps, easy bruising or bleeding.    Prior History:  The patient states he woke up on 01/19/2022 and had sudden onset of chest pain at 5:40 a.m..  The patient underwent CTA on 01/19/2022 showing subcentimeter mediastinal, hilar lymphadenopathy; filling defect in the lateral right middle lobe branch vessel; and filling defect in the posterior basal right lower lobe.  The patient was started on Eliquis at that time.    Past Medical History:   Past Medical History:   Diagnosis Date    Anxiety     GERD (gastroesophageal reflux disease)     Gout     History of chicken pox     HLD (hyperlipidemia)     Hypertension        Past Surgical HIstory:   Past Surgical History:   Procedure Laterality Date    ABDOMINAL SURGERY      APPENDECTOMY      TONSILLECTOMY         Family History:   Family History   Problem Relation Age of Onset    Heart disease Mother     Hypertension Mother     Hyperlipidemia Mother     Heart disease Father     Hyperlipidemia Father     Hypertension Father        Social History:  reports that he has never smoked. He has never used smokeless tobacco. He reports that he does not currently use alcohol after a past usage of about 12.0 standard  drinks per week. He reports that he does not use drugs.    Allergies:  Review of patient's allergies indicates:  No Known Allergies    Medications:  Current Outpatient Medications   Medication Sig Dispense Refill    bisoproloL-hydrochlorothiazide (ZIAC) 10-6.25 mg per tablet TAKE 1 TABLET BY MOUTH EVERY DAY 30 tablet 6    busPIRone (BUSPAR) 15 MG tablet Take 15 mg by mouth Daily.  0    colchicine (COLCRYS) 0.6 mg tablet Take 0.6 mg by mouth daily as needed.      esomeprazole (NEXIUM) 40 MG capsule Take 40 mg by mouth every other day.      LORazepam (ATIVAN) 1 MG tablet Take 1 mg by mouth once daily.  5    multivitamin capsule Take 1 capsule by mouth once daily.      tadalafiL (CIALIS) 20 MG Tab Take 1 tablet (20 mg total) by mouth every 48 hours as needed (ED). 15 tablet 11    aspirin 81 MG Chew Take 1 tablet (81 mg total) by mouth once daily. 30 tablet 0    ELIQUIS 5 mg Tab Take 1 tablet (5 mg total) by mouth 2 (two) times daily. 60 tablet 6     No current facility-administered medications for this visit.       Review of Systems   Constitutional:  Negative for chills, diaphoresis, fatigue, fever and unexpected weight change.   Respiratory:  Negative for cough and shortness of breath.    Cardiovascular:  Negative for chest pain and palpitations.   Gastrointestinal:  Negative for abdominal pain, constipation, diarrhea, nausea and vomiting.   Skin:  Negative for color change and rash.   Neurological:  Negative for headaches.   Hematological:  Negative for adenopathy. Does not bruise/bleed easily.     ECOG Performance Status: 0   Objective:      Vitals:   Vitals:    03/06/23 1350   BP: 121/76   BP Location: Left arm   Patient Position: Sitting   BP Method: Large (Automatic)   Pulse: 74   Resp: 18   Temp: 97.5 °F (36.4 °C)   TempSrc: Temporal   SpO2: 96%   Weight: 119.2 kg (262 lb 12.6 oz)       Physical Exam  Constitutional:       General: He is not in acute distress.     Appearance: He is well-developed. He is not  diaphoretic.   HENT:      Head: Normocephalic and atraumatic.   Cardiovascular:      Rate and Rhythm: Normal rate and regular rhythm.      Heart sounds: Normal heart sounds. No murmur heard.    No friction rub. No gallop.   Pulmonary:      Effort: Pulmonary effort is normal. No respiratory distress.      Breath sounds: Normal breath sounds. No wheezing or rales.   Chest:      Chest wall: No tenderness.   Abdominal:      General: Bowel sounds are normal. There is no distension.      Palpations: Abdomen is soft. There is no mass.      Tenderness: There is no abdominal tenderness. There is no rebound.   Musculoskeletal:      Cervical back: Normal range of motion.   Lymphadenopathy:      Cervical: No cervical adenopathy.      Upper Body:      Right upper body: No supraclavicular or axillary adenopathy.      Left upper body: No supraclavicular or axillary adenopathy.   Skin:     General: Skin is warm and dry.      Findings: No erythema or rash.   Neurological:      Mental Status: He is alert and oriented to person, place, and time.   Psychiatric:         Behavior: Behavior normal.       Laboratory Data:  Lab Visit on 03/06/2023   Component Date Value Ref Range Status    WBC 03/06/2023 7.45  3.90 - 12.70 K/uL Final    RBC 03/06/2023 4.70  4.60 - 6.20 M/uL Final    Hemoglobin 03/06/2023 15.1  14.0 - 18.0 g/dL Final    Hematocrit 03/06/2023 43.7  40.0 - 54.0 % Final    MCV 03/06/2023 93  82 - 98 fL Final    MCH 03/06/2023 32.1 (H)  27.0 - 31.0 pg Final    MCHC 03/06/2023 34.6  32.0 - 36.0 g/dL Final    RDW 03/06/2023 11.6  11.5 - 14.5 % Final    Platelets 03/06/2023 222  150 - 450 K/uL Final    MPV 03/06/2023 9.1 (L)  9.2 - 12.9 fL Final    Immature Granulocytes 03/06/2023 0.4  0.0 - 0.5 % Final    Gran # (ANC) 03/06/2023 4.2  1.8 - 7.7 K/uL Final    Immature Grans (Abs) 03/06/2023 0.03  0.00 - 0.04 K/uL Final    Comment: Mild elevation in immature granulocytes is non specific and   can be seen in a variety of conditions  including stress response,   acute inflammation, trauma and pregnancy. Correlation with other   laboratory and clinical findings is essential.      Lymph # 03/06/2023 2.2  1.0 - 4.8 K/uL Final    Mono # 03/06/2023 0.6  0.3 - 1.0 K/uL Final    Eos # 03/06/2023 0.4  0.0 - 0.5 K/uL Final    Baso # 03/06/2023 0.06  0.00 - 0.20 K/uL Final    nRBC 03/06/2023 0  0 /100 WBC Final    Gran % 03/06/2023 56.5  38.0 - 73.0 % Final    Lymph % 03/06/2023 28.9  18.0 - 48.0 % Final    Mono % 03/06/2023 8.3  4.0 - 15.0 % Final    Eosinophil % 03/06/2023 5.1  0.0 - 8.0 % Final    Basophil % 03/06/2023 0.8  0.0 - 1.9 % Final    Differential Method 03/06/2023 Automated   Final    Sodium 03/06/2023 142  136 - 145 mmol/L Final    Potassium 03/06/2023 4.2  3.5 - 5.1 mmol/L Final    Chloride 03/06/2023 108  95 - 110 mmol/L Final    CO2 03/06/2023 24  23 - 29 mmol/L Final    Glucose 03/06/2023 99  70 - 110 mg/dL Final    BUN 03/06/2023 17  6 - 20 mg/dL Final    Creatinine 03/06/2023 1.3  0.5 - 1.4 mg/dL Final    Calcium 03/06/2023 9.7  8.7 - 10.5 mg/dL Final    Total Protein 03/06/2023 8.1  6.0 - 8.4 g/dL Final    Albumin 03/06/2023 4.0  3.5 - 5.2 g/dL Final    Total Bilirubin 03/06/2023 1.0  0.1 - 1.0 mg/dL Final    Comment: For infants and newborns, interpretation of results should be based  on gestational age, weight and in agreement with clinical  observations.    Premature Infant recommended reference ranges:  Up to 24 hours.............<8.0 mg/dL  Up to 48 hours............<12.0 mg/dL  3-5 days..................<15.0 mg/dL  6-29 days.................<15.0 mg/dL      Alkaline Phosphatase 03/06/2023 44 (L)  55 - 135 U/L Final    AST 03/06/2023 47 (H)  10 - 40 U/L Final    ALT 03/06/2023 84 (H)  10 - 44 U/L Final    Anion Gap 03/06/2023 10  8 - 16 mmol/L Final    eGFR 03/06/2023 >60.0  >60 mL/min/1.73 m^2 Final         Imaging: CTA Chest 1/19/22    The central airways are patent. No displaced fracture or dislocation is appreciated.  No  significant effusions are appreciated.  No pneumothorax, lobar consolidation or cardiac decompensation is appreciated.  There are sub centimeter predominant mediastinal, hilar lymph nodes present.  Small hiatal hernia with slight gastroesophageal fold thickening is appreciated.  There is some minimal patchy dependent subsegmental atelectasis present.  There is some mild hepatic steatosis appearance overall.  There is some minimal fissure thickening.  There is otherwise unremarkable appearance of the included upper abdominal organs.  Unremarkable aortic contours are demonstrated. No central filling defects are appreciated.  Segmental evaluation is motion, bolus limited.  There does however a filling defect demonstrated at the lateral right middle lobe branch vessel sequence 9 images 41 through 43 as well as posterior basal right lower lobe image 58.  No other significant interval changes are appreciated.    Assessment:       1. Other chronic pulmonary embolism without acute cor pulmonale    2. Thrombophilia    3. Hypertension, unspecified type    4. Hyperlipidemia, unspecified hyperlipidemia type    5. Anxiety    6. Gout, unspecified cause, unspecified chronicity, unspecified site               Plan:     Chronic Pulmonary Embolism - The patient was diagnosed with an unpovoked PE on 1/19/22 with clots in the right lung  -Given his male gender I recommended lifelong anticoagulation  -Pt will need colonoscopy as age appropriate cancer screening  -PSA on 2/11/22 was 0.41ng/mL  -No need for hypercoagulable work up as the patient has not strong FHx of clotting and it would not   -Will continue to follow every 6 months    HTN - Pt on bisoprolol-HCTZ  -Will monitor    HLD - pt on lipitor  -Managemetn per cardiology    Anxiety - pt on buspar and ativan  -Management per PCP    Gout - pt on colchicine as needed  -no signs of active gout    Route Chart for Scheduling    Med Onc Chart Routing      Follow up with  physician 6 months. Pt needs a CBC, CMP and clinic visit in 6 months.   Follow up with E[facility]    Infusion scheduling note    Injection scheduling note    Labs    Imaging    Pharmacy appointment    Other referrals             Shekhar Wen MD  [facility]  Hematology and Oncology  [facility]   [address] [place] [address]   O: [phone]  F: [phone]

## 2023-03-06 ENCOUNTER — OFFICE VISIT (OUTPATIENT)
Dept: HEMATOLOGY/ONCOLOGY | Facility: CLINIC | Age: 53
End: 2023-03-06
Payer: COMMERCIAL

## 2023-03-06 ENCOUNTER — LAB VISIT (OUTPATIENT)
Dept: LAB | Facility: HOSPITAL | Age: 53
End: 2023-03-06
Attending: INTERNAL MEDICINE
Payer: COMMERCIAL

## 2023-03-06 VITALS
WEIGHT: 262.81 LBS | SYSTOLIC BLOOD PRESSURE: 121 MMHG | RESPIRATION RATE: 18 BRPM | BODY MASS INDEX: 41.16 KG/M2 | TEMPERATURE: 98 F | HEART RATE: 74 BPM | DIASTOLIC BLOOD PRESSURE: 76 MMHG | OXYGEN SATURATION: 96 %

## 2023-03-06 DIAGNOSIS — F41.9 ANXIETY: ICD-10-CM

## 2023-03-06 DIAGNOSIS — I27.82 OTHER CHRONIC PULMONARY EMBOLISM WITHOUT ACUTE COR PULMONALE: ICD-10-CM

## 2023-03-06 DIAGNOSIS — E78.5 HYPERLIPIDEMIA, UNSPECIFIED HYPERLIPIDEMIA TYPE: ICD-10-CM

## 2023-03-06 DIAGNOSIS — I27.82 OTHER CHRONIC PULMONARY EMBOLISM WITHOUT ACUTE COR PULMONALE: Primary | ICD-10-CM

## 2023-03-06 DIAGNOSIS — I10 HYPERTENSION, UNSPECIFIED TYPE: ICD-10-CM

## 2023-03-06 DIAGNOSIS — M10.9 GOUT, UNSPECIFIED CAUSE, UNSPECIFIED CHRONICITY, UNSPECIFIED SITE: ICD-10-CM

## 2023-03-06 DIAGNOSIS — D68.59 THROMBOPHILIA: ICD-10-CM

## 2023-03-06 LAB
ALBUMIN SERPL BCP-MCNC: 4 G/DL (ref 3.5–5.2)
ALP SERPL-CCNC: 44 U/L (ref 55–135)
ALT SERPL W/O P-5'-P-CCNC: 84 U/L (ref 10–44)
ANION GAP SERPL CALC-SCNC: 10 MMOL/L (ref 8–16)
AST SERPL-CCNC: 47 U/L (ref 10–40)
BASOPHILS # BLD AUTO: 0.06 K/UL (ref 0–0.2)
BASOPHILS NFR BLD: 0.8 % (ref 0–1.9)
BILIRUB SERPL-MCNC: 1 MG/DL (ref 0.1–1)
BUN SERPL-MCNC: 17 MG/DL (ref 6–20)
CALCIUM SERPL-MCNC: 9.7 MG/DL (ref 8.7–10.5)
CHLORIDE SERPL-SCNC: 108 MMOL/L (ref 95–110)
CO2 SERPL-SCNC: 24 MMOL/L (ref 23–29)
CREAT SERPL-MCNC: 1.3 MG/DL (ref 0.5–1.4)
DIFFERENTIAL METHOD: ABNORMAL
EOSINOPHIL # BLD AUTO: 0.4 K/UL (ref 0–0.5)
EOSINOPHIL NFR BLD: 5.1 % (ref 0–8)
ERYTHROCYTE [DISTWIDTH] IN BLOOD BY AUTOMATED COUNT: 11.6 % (ref 11.5–14.5)
EST. GFR  (NO RACE VARIABLE): >60 ML/MIN/1.73 M^2
GLUCOSE SERPL-MCNC: 99 MG/DL (ref 70–110)
HCT VFR BLD AUTO: 43.7 % (ref 40–54)
HGB BLD-MCNC: 15.1 G/DL (ref 14–18)
IMM GRANULOCYTES # BLD AUTO: 0.03 K/UL (ref 0–0.04)
IMM GRANULOCYTES NFR BLD AUTO: 0.4 % (ref 0–0.5)
LYMPHOCYTES # BLD AUTO: 2.2 K/UL (ref 1–4.8)
LYMPHOCYTES NFR BLD: 28.9 % (ref 18–48)
MCH RBC QN AUTO: 32.1 PG (ref 27–31)
MCHC RBC AUTO-ENTMCNC: 34.6 G/DL (ref 32–36)
MCV RBC AUTO: 93 FL (ref 82–98)
MONOCYTES # BLD AUTO: 0.6 K/UL (ref 0.3–1)
MONOCYTES NFR BLD: 8.3 % (ref 4–15)
NEUTROPHILS # BLD AUTO: 4.2 K/UL (ref 1.8–7.7)
NEUTROPHILS NFR BLD: 56.5 % (ref 38–73)
NRBC BLD-RTO: 0 /100 WBC
PLATELET # BLD AUTO: 222 K/UL (ref 150–450)
PMV BLD AUTO: 9.1 FL (ref 9.2–12.9)
POTASSIUM SERPL-SCNC: 4.2 MMOL/L (ref 3.5–5.1)
PROT SERPL-MCNC: 8.1 G/DL (ref 6–8.4)
RBC # BLD AUTO: 4.7 M/UL (ref 4.6–6.2)
SODIUM SERPL-SCNC: 142 MMOL/L (ref 136–145)
WBC # BLD AUTO: 7.45 K/UL (ref 3.9–12.7)

## 2023-03-06 PROCEDURE — 3008F BODY MASS INDEX DOCD: CPT | Mod: CPTII,S$GLB,, | Performed by: INTERNAL MEDICINE

## 2023-03-06 PROCEDURE — 99999 PR PBB SHADOW E&M-EST. PATIENT-LVL III: CPT | Mod: PBBFAC,,, | Performed by: INTERNAL MEDICINE

## 2023-03-06 PROCEDURE — 85025 COMPLETE CBC W/AUTO DIFF WBC: CPT | Mod: PN | Performed by: INTERNAL MEDICINE

## 2023-03-06 PROCEDURE — 80053 COMPREHEN METABOLIC PANEL: CPT | Mod: PN | Performed by: INTERNAL MEDICINE

## 2023-03-06 PROCEDURE — 99213 PR OFFICE/OUTPT VISIT, EST, LEVL III, 20-29 MIN: ICD-10-PCS | Mod: S$GLB,,, | Performed by: INTERNAL MEDICINE

## 2023-03-06 PROCEDURE — 36415 COLL VENOUS BLD VENIPUNCTURE: CPT | Mod: PN | Performed by: INTERNAL MEDICINE

## 2023-03-06 PROCEDURE — 1159F MED LIST DOCD IN RCRD: CPT | Mod: CPTII,S$GLB,, | Performed by: INTERNAL MEDICINE

## 2023-03-06 PROCEDURE — 3074F PR MOST RECENT SYSTOLIC BLOOD PRESSURE < 130 MM HG: ICD-10-PCS | Mod: CPTII,S$GLB,, | Performed by: INTERNAL MEDICINE

## 2023-03-06 PROCEDURE — 3078F DIAST BP <80 MM HG: CPT | Mod: CPTII,S$GLB,, | Performed by: INTERNAL MEDICINE

## 2023-03-06 PROCEDURE — 99213 OFFICE O/P EST LOW 20 MIN: CPT | Mod: S$GLB,,, | Performed by: INTERNAL MEDICINE

## 2023-03-06 PROCEDURE — 99999 PR PBB SHADOW E&M-EST. PATIENT-LVL III: ICD-10-PCS | Mod: PBBFAC,,, | Performed by: INTERNAL MEDICINE

## 2023-03-06 PROCEDURE — 1159F PR MEDICATION LIST DOCUMENTED IN MEDICAL RECORD: ICD-10-PCS | Mod: CPTII,S$GLB,, | Performed by: INTERNAL MEDICINE

## 2023-03-06 PROCEDURE — 3008F PR BODY MASS INDEX (BMI) DOCUMENTED: ICD-10-PCS | Mod: CPTII,S$GLB,, | Performed by: INTERNAL MEDICINE

## 2023-03-06 PROCEDURE — 3078F PR MOST RECENT DIASTOLIC BLOOD PRESSURE < 80 MM HG: ICD-10-PCS | Mod: CPTII,S$GLB,, | Performed by: INTERNAL MEDICINE

## 2023-03-06 PROCEDURE — 3074F SYST BP LT 130 MM HG: CPT | Mod: CPTII,S$GLB,, | Performed by: INTERNAL MEDICINE

## 2023-03-06 RX ORDER — APIXABAN 5 MG/1
5 TABLET, FILM COATED ORAL 2 TIMES DAILY
Qty: 60 TABLET | Refills: 6 | Status: SHIPPED | OUTPATIENT
Start: 2023-03-06 | End: 2023-10-02 | Stop reason: SDUPTHER

## 2023-04-17 ENCOUNTER — OFFICE VISIT (OUTPATIENT)
Dept: UROLOGY | Facility: CLINIC | Age: 53
End: 2023-04-17
Payer: COMMERCIAL

## 2023-04-17 VITALS — HEIGHT: 67 IN | BODY MASS INDEX: 41.21 KG/M2 | WEIGHT: 262.56 LBS

## 2023-04-17 DIAGNOSIS — Z12.5 SCREENING FOR PROSTATE CANCER: Primary | ICD-10-CM

## 2023-04-17 DIAGNOSIS — N52.01 ERECTILE DYSFUNCTION DUE TO ARTERIAL INSUFFICIENCY: ICD-10-CM

## 2023-04-17 DIAGNOSIS — N40.0 BPH WITHOUT URINARY OBSTRUCTION: ICD-10-CM

## 2023-04-17 DIAGNOSIS — R79.89 LOW TESTOSTERONE LEVEL IN MALE: ICD-10-CM

## 2023-04-17 LAB
BILIRUBIN, UA POC OHS: NEGATIVE
BLOOD, UA POC OHS: NEGATIVE
CLARITY, UA POC OHS: CLEAR
COLOR, UA POC OHS: YELLOW
GLUCOSE, UA POC OHS: NEGATIVE
KETONES, UA POC OHS: NEGATIVE
LEUKOCYTES, UA POC OHS: NEGATIVE
NITRITE, UA POC OHS: NEGATIVE
PH, UA POC OHS: 5.5
PROTEIN, UA POC OHS: NEGATIVE
SPECIFIC GRAVITY, UA POC OHS: <=1.005
UROBILINOGEN, UA POC OHS: 0.2

## 2023-04-17 PROCEDURE — 99214 OFFICE O/P EST MOD 30 MIN: CPT | Mod: S$GLB,,, | Performed by: UROLOGY

## 2023-04-17 PROCEDURE — 99999 PR PBB SHADOW E&M-EST. PATIENT-LVL III: ICD-10-PCS | Mod: PBBFAC,,, | Performed by: UROLOGY

## 2023-04-17 PROCEDURE — 81003 URINALYSIS AUTO W/O SCOPE: CPT | Mod: QW,S$GLB,, | Performed by: UROLOGY

## 2023-04-17 PROCEDURE — 99214 PR OFFICE/OUTPT VISIT, EST, LEVL IV, 30-39 MIN: ICD-10-PCS | Mod: S$GLB,,, | Performed by: UROLOGY

## 2023-04-17 PROCEDURE — 3008F BODY MASS INDEX DOCD: CPT | Mod: CPTII,S$GLB,, | Performed by: UROLOGY

## 2023-04-17 PROCEDURE — 99999 PR PBB SHADOW E&M-EST. PATIENT-LVL III: CPT | Mod: PBBFAC,,, | Performed by: UROLOGY

## 2023-04-17 PROCEDURE — 81003 POCT URINALYSIS(INSTRUMENT): ICD-10-PCS | Mod: QW,S$GLB,, | Performed by: UROLOGY

## 2023-04-17 PROCEDURE — 1159F PR MEDICATION LIST DOCUMENTED IN MEDICAL RECORD: ICD-10-PCS | Mod: CPTII,S$GLB,, | Performed by: UROLOGY

## 2023-04-17 PROCEDURE — 1159F MED LIST DOCD IN RCRD: CPT | Mod: CPTII,S$GLB,, | Performed by: UROLOGY

## 2023-04-17 PROCEDURE — 3008F PR BODY MASS INDEX (BMI) DOCUMENTED: ICD-10-PCS | Mod: CPTII,S$GLB,, | Performed by: UROLOGY

## 2023-04-17 RX ORDER — TADALAFIL 20 MG/1
20 TABLET ORAL
Qty: 15 TABLET | Refills: 11 | Status: SHIPPED | OUTPATIENT
Start: 2023-04-17 | End: 2023-11-08 | Stop reason: SDUPTHER

## 2023-04-17 NOTE — PROGRESS NOTES
Subjective:       Patient ID: Emily Walls is a 52 y.o. male.    Chief Complaint: Annual Exam    HPI    52-year-old with a history of low testosterone.  He had been on IM testosterone replacement.  He developed a pulmonary embolus since last seen as currently taking Eliquis.  He is no longer on testosterone replacement.  He does not have any worsening symptoms.  He has ED and uses tadalafil which is effective.  He has no bothersome urinary symptoms.  He is here for routine annual follow-up.  Urine dipstick shows negative for all components.    Review of Systems   Constitutional:  Negative for fever.   Genitourinary:  Negative for dysuria and hematuria.     Objective:      Physical Exam  Vitals reviewed.   Constitutional:       Appearance: He is well-developed.   Pulmonary:      Effort: Pulmonary effort is normal.   Genitourinary:     Prostate: Enlarged. Not tender and no nodules present.   Skin:     Findings: No rash.   Neurological:      Mental Status: He is alert and oriented to person, place, and time.       Assessment:       1. Screening for prostate cancer    2. Erectile dysfunction due to arterial insufficiency    3. BPH without urinary obstruction    4. Low testosterone level in male        Plan:       Screening for prostate cancer  -     POCT Urinalysis(Instrument)  -     PSA, Screening; Future; Expected date: 04/17/2023    Erectile dysfunction due to arterial insufficiency    BPH without urinary obstruction    Low testosterone level in male    Other orders  -     tadalafiL (CIALIS) 20 MG Tab; Take 1 tablet (20 mg total) by mouth every 48 hours as needed (ED).  Dispense: 15 tablet; Refill: 11      Update PSA.  Continue tadalafil.  We will continue to hold testosterone replacement follow-up annually

## 2023-05-03 PROBLEM — K76.0 STEATOSIS OF LIVER: Status: ACTIVE | Noted: 2023-05-03

## 2023-05-29 NOTE — TELEPHONE ENCOUNTER
----- Message from Jose Yost sent at 3/6/2019  8:57 AM CST -----  Type: Needs Medical Advice    Who Called:  Patient  Best Call Back Number:  [phone] (home)   Additional Information: Patient would like to speak to office regarding Cialis - please call to advise.    
Pt requesting refill of tadalafil  
no discharge, no irritation, no pain, no redness, and no visual changes.

## 2023-08-08 PROBLEM — Z13.6 ENCOUNTER FOR SCREENING FOR CARDIOVASCULAR DISORDERS: Status: ACTIVE | Noted: 2023-08-08

## 2023-08-23 ENCOUNTER — DOCUMENTATION ONLY (OUTPATIENT)
Dept: REHABILITATION | Facility: HOSPITAL | Age: 53
End: 2023-08-23
Payer: COMMERCIAL

## 2023-08-23 DIAGNOSIS — R29.898 RIGHT HAND WEAKNESS: ICD-10-CM

## 2023-08-23 DIAGNOSIS — M25.531 PAIN, WRIST, RIGHT: ICD-10-CM

## 2023-08-23 DIAGNOSIS — M25.631 STIFFNESS OF RIGHT WRIST JOINT: Primary | ICD-10-CM

## 2023-08-23 NOTE — PROGRESS NOTES
Occupational Therapy D/C Note  Date: 8/23/2023    Pt was referred by Jonathan Donahue PA-C and seen for initial evaluation on 9/14/2203  for Extensor tenosynovitis of right wrist. Pt attended 2 OT visits for pain, weakness hand and stiffness of wrist (therapy Dx). See last OT Tx note dated 9/20/2022, for last objective measures as well as goal achievement.  Pt has not attended OT since. Current status is unknown.   Pt to be discharged from OT at this time.    RAVINDRA Sumner/NORTH

## 2023-09-26 ENCOUNTER — PATIENT MESSAGE (OUTPATIENT)
Dept: HEMATOLOGY/ONCOLOGY | Facility: CLINIC | Age: 53
End: 2023-09-26
Payer: COMMERCIAL

## 2023-10-02 ENCOUNTER — LAB VISIT (OUTPATIENT)
Dept: LAB | Facility: HOSPITAL | Age: 53
End: 2023-10-02
Attending: NURSE PRACTITIONER
Payer: COMMERCIAL

## 2023-10-02 ENCOUNTER — OFFICE VISIT (OUTPATIENT)
Dept: HEMATOLOGY/ONCOLOGY | Facility: CLINIC | Age: 53
End: 2023-10-02
Payer: COMMERCIAL

## 2023-10-02 VITALS
SYSTOLIC BLOOD PRESSURE: 144 MMHG | HEART RATE: 77 BPM | WEIGHT: 269.38 LBS | BODY MASS INDEX: 42.28 KG/M2 | OXYGEN SATURATION: 96 % | TEMPERATURE: 97 F | RESPIRATION RATE: 16 BRPM | HEIGHT: 67 IN | DIASTOLIC BLOOD PRESSURE: 80 MMHG

## 2023-10-02 DIAGNOSIS — Z86.711 HISTORY OF PULMONARY EMBOLUS (PE): Primary | ICD-10-CM

## 2023-10-02 DIAGNOSIS — I27.82 OTHER CHRONIC PULMONARY EMBOLISM WITHOUT ACUTE COR PULMONALE: ICD-10-CM

## 2023-10-02 DIAGNOSIS — I10 PRIMARY HYPERTENSION: ICD-10-CM

## 2023-10-02 DIAGNOSIS — Z79.01 ON CONTINUOUS ORAL ANTICOAGULATION: ICD-10-CM

## 2023-10-02 DIAGNOSIS — K76.0 STEATOSIS OF LIVER: ICD-10-CM

## 2023-10-02 LAB
ALBUMIN SERPL BCP-MCNC: 3.7 G/DL (ref 3.5–5.2)
ALP SERPL-CCNC: 40 U/L (ref 55–135)
ALT SERPL W/O P-5'-P-CCNC: 74 U/L (ref 10–44)
ANION GAP SERPL CALC-SCNC: 7 MMOL/L (ref 8–16)
AST SERPL-CCNC: 44 U/L (ref 10–40)
BASOPHILS # BLD AUTO: 0.07 K/UL (ref 0–0.2)
BASOPHILS NFR BLD: 1 % (ref 0–1.9)
BILIRUB SERPL-MCNC: 1.1 MG/DL (ref 0.1–1)
BUN SERPL-MCNC: 14 MG/DL (ref 6–20)
CALCIUM SERPL-MCNC: 8.9 MG/DL (ref 8.7–10.5)
CHLORIDE SERPL-SCNC: 108 MMOL/L (ref 95–110)
CO2 SERPL-SCNC: 26 MMOL/L (ref 23–29)
CREAT SERPL-MCNC: 1 MG/DL (ref 0.5–1.4)
DIFFERENTIAL METHOD: ABNORMAL
EOSINOPHIL # BLD AUTO: 0.3 K/UL (ref 0–0.5)
EOSINOPHIL NFR BLD: 4.6 % (ref 0–8)
ERYTHROCYTE [DISTWIDTH] IN BLOOD BY AUTOMATED COUNT: 11.5 % (ref 11.5–14.5)
EST. GFR  (NO RACE VARIABLE): >60 ML/MIN/1.73 M^2
GLUCOSE SERPL-MCNC: 97 MG/DL (ref 70–110)
HCT VFR BLD AUTO: 41.4 % (ref 40–54)
HGB BLD-MCNC: 14.6 G/DL (ref 14–18)
IMM GRANULOCYTES # BLD AUTO: 0.05 K/UL (ref 0–0.04)
IMM GRANULOCYTES NFR BLD AUTO: 0.7 % (ref 0–0.5)
LYMPHOCYTES # BLD AUTO: 2.1 K/UL (ref 1–4.8)
LYMPHOCYTES NFR BLD: 29.5 % (ref 18–48)
MCH RBC QN AUTO: 32.9 PG (ref 27–31)
MCHC RBC AUTO-ENTMCNC: 35.3 G/DL (ref 32–36)
MCV RBC AUTO: 93 FL (ref 82–98)
MONOCYTES # BLD AUTO: 0.7 K/UL (ref 0.3–1)
MONOCYTES NFR BLD: 9.8 % (ref 4–15)
NEUTROPHILS # BLD AUTO: 3.9 K/UL (ref 1.8–7.7)
NEUTROPHILS NFR BLD: 54.4 % (ref 38–73)
NRBC BLD-RTO: 0 /100 WBC
PLATELET # BLD AUTO: 214 K/UL (ref 150–450)
PMV BLD AUTO: 9.2 FL (ref 9.2–12.9)
POTASSIUM SERPL-SCNC: 3.9 MMOL/L (ref 3.5–5.1)
PROT SERPL-MCNC: 7.5 G/DL (ref 6–8.4)
RBC # BLD AUTO: 4.44 M/UL (ref 4.6–6.2)
SODIUM SERPL-SCNC: 141 MMOL/L (ref 136–145)
WBC # BLD AUTO: 7.23 K/UL (ref 3.9–12.7)

## 2023-10-02 PROCEDURE — 3044F PR MOST RECENT HEMOGLOBIN A1C LEVEL <7.0%: ICD-10-PCS | Mod: CPTII,S$GLB,, | Performed by: NURSE PRACTITIONER

## 2023-10-02 PROCEDURE — 99213 OFFICE O/P EST LOW 20 MIN: CPT | Mod: S$GLB,,, | Performed by: NURSE PRACTITIONER

## 2023-10-02 PROCEDURE — 99999 PR PBB SHADOW E&M-EST. PATIENT-LVL IV: ICD-10-PCS | Mod: PBBFAC,,, | Performed by: NURSE PRACTITIONER

## 2023-10-02 PROCEDURE — 1160F PR REVIEW ALL MEDS BY PRESCRIBER/CLIN PHARMACIST DOCUMENTED: ICD-10-PCS | Mod: CPTII,S$GLB,, | Performed by: NURSE PRACTITIONER

## 2023-10-02 PROCEDURE — 99213 PR OFFICE/OUTPT VISIT, EST, LEVL III, 20-29 MIN: ICD-10-PCS | Mod: S$GLB,,, | Performed by: NURSE PRACTITIONER

## 2023-10-02 PROCEDURE — 99999 PR PBB SHADOW E&M-EST. PATIENT-LVL IV: CPT | Mod: PBBFAC,,, | Performed by: NURSE PRACTITIONER

## 2023-10-02 PROCEDURE — 3044F HG A1C LEVEL LT 7.0%: CPT | Mod: CPTII,S$GLB,, | Performed by: NURSE PRACTITIONER

## 2023-10-02 PROCEDURE — 3077F PR MOST RECENT SYSTOLIC BLOOD PRESSURE >= 140 MM HG: ICD-10-PCS | Mod: CPTII,S$GLB,, | Performed by: NURSE PRACTITIONER

## 2023-10-02 PROCEDURE — 3077F SYST BP >= 140 MM HG: CPT | Mod: CPTII,S$GLB,, | Performed by: NURSE PRACTITIONER

## 2023-10-02 PROCEDURE — 3008F BODY MASS INDEX DOCD: CPT | Mod: CPTII,S$GLB,, | Performed by: NURSE PRACTITIONER

## 2023-10-02 PROCEDURE — 1159F PR MEDICATION LIST DOCUMENTED IN MEDICAL RECORD: ICD-10-PCS | Mod: CPTII,S$GLB,, | Performed by: NURSE PRACTITIONER

## 2023-10-02 PROCEDURE — 1159F MED LIST DOCD IN RCRD: CPT | Mod: CPTII,S$GLB,, | Performed by: NURSE PRACTITIONER

## 2023-10-02 PROCEDURE — 3008F PR BODY MASS INDEX (BMI) DOCUMENTED: ICD-10-PCS | Mod: CPTII,S$GLB,, | Performed by: NURSE PRACTITIONER

## 2023-10-02 PROCEDURE — 80053 COMPREHEN METABOLIC PANEL: CPT | Mod: PN | Performed by: INTERNAL MEDICINE

## 2023-10-02 PROCEDURE — 36415 COLL VENOUS BLD VENIPUNCTURE: CPT | Mod: PN | Performed by: INTERNAL MEDICINE

## 2023-10-02 PROCEDURE — 85025 COMPLETE CBC W/AUTO DIFF WBC: CPT | Mod: PN | Performed by: INTERNAL MEDICINE

## 2023-10-02 PROCEDURE — 3079F PR MOST RECENT DIASTOLIC BLOOD PRESSURE 80-89 MM HG: ICD-10-PCS | Mod: CPTII,S$GLB,, | Performed by: NURSE PRACTITIONER

## 2023-10-02 PROCEDURE — 1160F RVW MEDS BY RX/DR IN RCRD: CPT | Mod: CPTII,S$GLB,, | Performed by: NURSE PRACTITIONER

## 2023-10-02 PROCEDURE — 3079F DIAST BP 80-89 MM HG: CPT | Mod: CPTII,S$GLB,, | Performed by: NURSE PRACTITIONER

## 2023-10-02 NOTE — PROGRESS NOTES
PATIENT: Emily Walls  MRN: [id number]  DATE: 10/2/2023    Diagnosis:   1. History of pulmonary embolus (PE)    2. On continuous oral anticoagulation    3. Steatosis of liver    4. Primary hypertension      Chief Complaint: Other chronic pulmonary embolism without acute cor pulmonale (6 mo follow up)      Subjective:    Interval History:Mr. Walls is a 53 y.o. male with Anxiety, GERD, Gout, HLD, HTN who presents for diagnosis of pulmonary embolism.  He was started on Eliquis and remains compliant.  Since the last clinic visit the patient states he has felt well.  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  Efforts at weight loss have been unsuccessful.   The patient denies CP, cough, SOB, abdominal pain, N/V, constipation, diarrhea, fever, chills, night sweats, weight loss, new lumps or bumps, easy bruising or bleeding.    Prior History:  The patient states he woke up on 01/19/2022 and had sudden onset of chest pain at 5:40 a.m..  The patient underwent CTA on 01/19/2022 showing subcentimeter mediastinal, hilar lymphadenopathy; filling defect in the lateral right middle lobe branch vessel; and filling defect in the posterior basal right lower lobe.  The patient was started on Eliquis at that time.    Past Medical History:   Past Medical History:   Diagnosis Date    Anxiety     GERD (gastroesophageal reflux disease)     Gout     History of chicken pox     HLD (hyperlipidemia)     Hypertension        Past Surgical HIstory:   Past Surgical History:   Procedure Laterality Date    ABDOMINAL SURGERY      APPENDECTOMY      TONSILLECTOMY         Family History:   Family History   Problem Relation Age of Onset    Heart disease Mother     Hypertension Mother     Hyperlipidemia Mother     Heart disease Father     Hyperlipidemia Father     Hypertension Father        Social History:  reports that he has never smoked. He has never used smokeless tobacco. He reports that he does not currently use alcohol. He reports that he does not  use drugs.    Allergies:  Review of patient's allergies indicates:  No Known Allergies    Medications:  Current Outpatient Medications   Medication Sig Dispense Refill    bisoproloL-hydrochlorothiazide (ZIAC) 10-6.25 mg per tablet Take 1 tablet by mouth once daily. 30 tablet 6    busPIRone (BUSPAR) 15 MG tablet Take 1/2 to whole tab daily as needed for anxiety 90 tablet 1    colchicine (COLCRYS) 0.6 mg tablet Take 1 tablet (0.6 mg total) by mouth daily as needed (gout). 30 tablet 0    ELIQUIS 5 mg Tab Take 1 tablet (5 mg total) by mouth 2 (two) times daily. 60 tablet 6    esomeprazole (NEXIUM) 40 MG capsule Take 1 capsule (40 mg total) by mouth every other day. 90 capsule 1    LORazepam (ATIVAN) 1 MG tablet Take 0.5 tablets (0.5 mg total) by mouth daily as needed for Anxiety. 30 tablet 0    multivitamin capsule Take 1 capsule by mouth once daily.      tadalafiL (CIALIS) 20 MG Tab Take 1 tablet (20 mg total) by mouth every 48 hours as needed (ED). 15 tablet 11    aspirin 81 MG Chew Take 1 tablet (81 mg total) by mouth once daily. 30 tablet 0     No current facility-administered medications for this visit.       Review of Systems   Constitutional:  Negative for appetite change, chills, diaphoresis, fatigue, fever and unexpected weight change.   HENT:  Negative for mouth sores.    Eyes:  Negative for visual disturbance.   Respiratory:  Negative for cough and shortness of breath.    Cardiovascular:  Negative for chest pain and palpitations.   Gastrointestinal:  Negative for abdominal pain, constipation, diarrhea, nausea and vomiting.   Genitourinary:  Negative for frequency.   Musculoskeletal:  Negative for back pain.   Skin:  Negative for color change and rash.   Neurological:  Negative for headaches.   Hematological:  Negative for adenopathy. Does not bruise/bleed easily.   Psychiatric/Behavioral:  The patient is not nervous/anxious.        ECOG Performance Status: 0   Objective:      Vitals:   Vitals:    10/02/23  "1430   BP: (!) 144/80   BP Location: Left arm   Patient Position: Sitting   BP Method: Large (Manual)   Pulse: 77   Resp: 16   Temp: 97.1 °F (36.2 °C)   TempSrc: Temporal   SpO2: 96%   Weight: 122.2 kg (269 lb 6.4 oz)   Height: 5' 7" (1.702 m)       Physical Exam  Constitutional:       General: He is not in acute distress.     Appearance: He is well-developed. He is not diaphoretic.   HENT:      Head: Normocephalic and atraumatic.   Cardiovascular:      Rate and Rhythm: Normal rate and regular rhythm.      Heart sounds: Normal heart sounds. No murmur heard.     No friction rub. No gallop.   Pulmonary:      Effort: Pulmonary effort is normal. No respiratory distress.      Breath sounds: Normal breath sounds. No wheezing or rales.   Chest:      Chest wall: No tenderness.   Abdominal:      General: Bowel sounds are normal. There is no distension.      Palpations: Abdomen is soft. There is no mass.      Tenderness: There is no abdominal tenderness. There is no rebound.   Musculoskeletal:      Cervical back: Normal range of motion.   Lymphadenopathy:      Cervical: No cervical adenopathy.      Upper Body:      Right upper body: No supraclavicular or axillary adenopathy.      Left upper body: No supraclavicular or axillary adenopathy.   Skin:     General: Skin is warm and dry.      Findings: No erythema or rash.   Neurological:      Mental Status: He is alert and oriented to person, place, and time.   Psychiatric:         Behavior: Behavior normal.         Laboratory Data:  Lab Visit on 10/02/2023   Component Date Value Ref Range Status    WBC 10/02/2023 7.23  3.90 - 12.70 K/uL Final    RBC 10/02/2023 4.44 (L)  4.60 - 6.20 M/uL Final    Hemoglobin 10/02/2023 14.6  14.0 - 18.0 g/dL Final    Hematocrit 10/02/2023 41.4  40.0 - 54.0 % Final    MCV 10/02/2023 93  82 - 98 fL Final    MCH 10/02/2023 32.9 (H)  27.0 - 31.0 pg Final    MCHC 10/02/2023 35.3  32.0 - 36.0 g/dL Final    RDW 10/02/2023 11.5  11.5 - 14.5 % Final    " Platelets 10/02/2023 214  150 - 450 K/uL Final    MPV 10/02/2023 9.2  9.2 - 12.9 fL Final    Immature Granulocytes 10/02/2023 0.7 (H)  0.0 - 0.5 % Final    Gran # (ANC) 10/02/2023 3.9  1.8 - 7.7 K/uL Final    Immature Grans (Abs) 10/02/2023 0.05 (H)  0.00 - 0.04 K/uL Final    Comment: Mild elevation in immature granulocytes is non specific and   can be seen in a variety of conditions including stress response,   acute inflammation, trauma and pregnancy. Correlation with other   laboratory and clinical findings is essential.      Lymph # 10/02/2023 2.1  1.0 - 4.8 K/uL Final    Mono # 10/02/2023 0.7  0.3 - 1.0 K/uL Final    Eos # 10/02/2023 0.3  0.0 - 0.5 K/uL Final    Baso # 10/02/2023 0.07  0.00 - 0.20 K/uL Final    nRBC 10/02/2023 0  0 /100 WBC Final    Gran % 10/02/2023 54.4  38.0 - 73.0 % Final    Lymph % 10/02/2023 29.5  18.0 - 48.0 % Final    Mono % 10/02/2023 9.8  4.0 - 15.0 % Final    Eosinophil % 10/02/2023 4.6  0.0 - 8.0 % Final    Basophil % 10/02/2023 1.0  0.0 - 1.9 % Final    Differential Method 10/02/2023 Automated   Final    Sodium 10/02/2023 141  136 - 145 mmol/L Final    Potassium 10/02/2023 3.9  3.5 - 5.1 mmol/L Final    Chloride 10/02/2023 108  95 - 110 mmol/L Final    CO2 10/02/2023 26  23 - 29 mmol/L Final    Glucose 10/02/2023 97  70 - 110 mg/dL Final    BUN 10/02/2023 14  6 - 20 mg/dL Final    Creatinine 10/02/2023 1.0  0.5 - 1.4 mg/dL Final    Calcium 10/02/2023 8.9  8.7 - 10.5 mg/dL Final    Total Protein 10/02/2023 7.5  6.0 - 8.4 g/dL Final    Albumin 10/02/2023 3.7  3.5 - 5.2 g/dL Final    Total Bilirubin 10/02/2023 1.1 (H)  0.1 - 1.0 mg/dL Final    Comment: For infants and newborns, interpretation of results should be based  on gestational age, weight and in agreement with clinical  observations.    Premature Infant recommended reference ranges:  Up to 24 hours.............<8.0 mg/dL  Up to 48 hours............<12.0 mg/dL  3-5 days..................<15.0 mg/dL  6-29  days.................<15.0 mg/dL      Alkaline Phosphatase 10/02/2023 40 (L)  55 - 135 U/L Final    AST 10/02/2023 44 (H)  10 - 40 U/L Final    ALT 10/02/2023 74 (H)  10 - 44 U/L Final    eGFR 10/02/2023 >60.0  >60 mL/min/1.73 m^2 Final    Anion Gap 10/02/2023 7 (L)  8 - 16 mmol/L Final     Imaging: CTA Chest 1/19/22    The central airways are patent. No displaced fracture or dislocation is appreciated.  No significant effusions are appreciated.  No pneumothorax, lobar consolidation or cardiac decompensation is appreciated.  There are sub centimeter predominant mediastinal, hilar lymph nodes present.  Small hiatal hernia with slight gastroesophageal fold thickening is appreciated.  There is some minimal patchy dependent subsegmental atelectasis present.  There is some mild hepatic steatosis appearance overall.  There is some minimal fissure thickening.  There is otherwise unremarkable appearance of the included upper abdominal organs.  Unremarkable aortic contours are demonstrated. No central filling defects are appreciated.  Segmental evaluation is motion, bolus limited.  There does however a filling defect demonstrated at the lateral right middle lobe branch vessel sequence 9 images 41 through 43 as well as posterior basal right lower lobe image 58.  No other significant interval changes are appreciated.    Assessment:       1. History of pulmonary embolus (PE)    2. On continuous oral anticoagulation    3. Steatosis of liver    4. Primary hypertension               Plan:     Chronic Pulmonary Embolism - The patient was diagnosed with an unpovoked PE on 1/19/22 with clots in the right lung  -Given his male gender I recommended lifelong anticoagulation  -Pt will need colonoscopy as age appropriate cancer screening  -PSA on 2/11/22 was 0.41ng/mL  -No need for hypercoagulable work up as the patient has not strong FHx of clotting and it would not   -Will continue to follow every 6 months  10/02/23:   stable; f/u in 6 months with labs.    HTN - Pt on bisoprolol-HCTZ  -Will monitor    HLD - pt on lipitor  -Managemetn per cardiology    Anxiety - pt on buspar and ativan  -Management per PCP    Gout - pt on colchicine as needed  -no signs of active gout    Route Chart for Scheduling    Med Onc Chart Routing      Follow up with physician 6 months. F/U in 6 months with CBC, CMP prior   Follow up with ELEANOR    Infusion scheduling note    Injection scheduling note    Labs    Imaging    Pharmacy appointment    Other referrals               22 minutes were spent in coordination of patient's care, record review and counseling.

## 2023-10-11 ENCOUNTER — PATIENT MESSAGE (OUTPATIENT)
Dept: ADMINISTRATIVE | Facility: OTHER | Age: 53
End: 2023-10-11
Payer: COMMERCIAL

## 2023-10-17 PROBLEM — R07.9 CHEST PAIN: Status: ACTIVE | Noted: 2022-01-17

## 2023-10-21 ENCOUNTER — PATIENT MESSAGE (OUTPATIENT)
Dept: OTHER | Facility: OTHER | Age: 53
End: 2023-10-21
Payer: COMMERCIAL

## 2023-11-08 PROBLEM — Z13.6 ENCOUNTER FOR SCREENING FOR CARDIOVASCULAR DISORDERS: Status: RESOLVED | Noted: 2023-08-08 | Resolved: 2023-11-08

## 2023-12-18 PROBLEM — R74.8 ELEVATED LIVER ENZYMES: Status: ACTIVE | Noted: 2023-12-18

## 2024-02-15 ENCOUNTER — PATIENT MESSAGE (OUTPATIENT)
Dept: HEMATOLOGY/ONCOLOGY | Facility: CLINIC | Age: 54
End: 2024-02-15
Payer: COMMERCIAL

## 2024-05-10 ENCOUNTER — PATIENT MESSAGE (OUTPATIENT)
Dept: ORTHOPEDICS | Facility: CLINIC | Age: 54
End: 2024-05-10
Payer: COMMERCIAL

## 2024-05-10 DIAGNOSIS — M17.11 PRIMARY OSTEOARTHRITIS OF RIGHT KNEE: Primary | ICD-10-CM

## 2024-05-10 DIAGNOSIS — M70.41 PREPATELLAR BURSITIS OF RIGHT KNEE: Primary | ICD-10-CM

## 2024-05-10 RX ORDER — DOXYCYCLINE HYCLATE 100 MG
100 TABLET ORAL 2 TIMES DAILY
Qty: 20 TABLET | Refills: 0 | Status: SHIPPED | OUTPATIENT
Start: 2024-05-10 | End: 2024-05-20

## 2024-05-14 ENCOUNTER — TELEPHONE (OUTPATIENT)
Dept: ORTHOPEDICS | Facility: CLINIC | Age: 54
End: 2024-05-14
Payer: COMMERCIAL

## 2024-05-14 NOTE — TELEPHONE ENCOUNTER
----- Message from Cipriano Zhu sent at 5/14/2024  9:41 AM CDT -----  Patient would like a callback regarding needing a doctors note for work.    [phone]

## 2024-05-22 ENCOUNTER — OFFICE VISIT (OUTPATIENT)
Dept: ORTHOPEDICS | Facility: CLINIC | Age: 54
End: 2024-05-22
Payer: COMMERCIAL

## 2024-05-22 ENCOUNTER — HOSPITAL ENCOUNTER (OUTPATIENT)
Dept: RADIOLOGY | Facility: HOSPITAL | Age: 54
Discharge: HOME | End: 2024-05-22
Attending: NURSE PRACTITIONER
Payer: COMMERCIAL

## 2024-05-22 VITALS — HEIGHT: 67 IN | WEIGHT: 268.06 LBS | BODY MASS INDEX: 42.07 KG/M2

## 2024-05-22 DIAGNOSIS — M17.11 PRIMARY OSTEOARTHRITIS OF RIGHT KNEE: ICD-10-CM

## 2024-05-22 DIAGNOSIS — M17.11 PRIMARY OSTEOARTHRITIS OF RIGHT KNEE: Primary | ICD-10-CM

## 2024-05-22 PROCEDURE — 20610 DRAIN/INJ JOINT/BURSA W/O US: CPT | Mod: RT,S$GLB,, | Performed by: NURSE PRACTITIONER

## 2024-05-22 PROCEDURE — 1160F RVW MEDS BY RX/DR IN RCRD: CPT | Mod: CPTII,S$GLB,, | Performed by: NURSE PRACTITIONER

## 2024-05-22 PROCEDURE — 73560 X-RAY EXAM OF KNEE 1 OR 2: CPT | Mod: TC,PO,LT

## 2024-05-22 PROCEDURE — 73562 X-RAY EXAM OF KNEE 3: CPT | Mod: 26,RT,, | Performed by: RADIOLOGY

## 2024-05-22 PROCEDURE — 73562 X-RAY EXAM OF KNEE 3: CPT | Mod: TC,PO,RT

## 2024-05-22 PROCEDURE — 1159F MED LIST DOCD IN RCRD: CPT | Mod: CPTII,S$GLB,, | Performed by: NURSE PRACTITIONER

## 2024-05-22 PROCEDURE — 99999 PR PBB SHADOW E&M-EST. PATIENT-LVL III: CPT | Mod: PBBFAC,,, | Performed by: NURSE PRACTITIONER

## 2024-05-22 PROCEDURE — 3008F BODY MASS INDEX DOCD: CPT | Mod: CPTII,S$GLB,, | Performed by: NURSE PRACTITIONER

## 2024-05-22 PROCEDURE — 99213 OFFICE O/P EST LOW 20 MIN: CPT | Mod: 25,S$GLB,, | Performed by: NURSE PRACTITIONER

## 2024-05-22 PROCEDURE — 73560 X-RAY EXAM OF KNEE 1 OR 2: CPT | Mod: 26,59,LT, | Performed by: RADIOLOGY

## 2024-05-22 RX ADMIN — TRIAMCINOLONE ACETONIDE 40 MG: 40 INJECTION, SUSPENSION INTRA-ARTICULAR; INTRAMUSCULAR at 09:05

## 2024-05-23 ENCOUNTER — TELEPHONE (OUTPATIENT)
Dept: HEMATOLOGY/ONCOLOGY | Facility: CLINIC | Age: 54
End: 2024-05-23
Payer: COMMERCIAL

## 2024-05-23 NOTE — TELEPHONE ENCOUNTER
Left message for patient to arrive one hour prior to appt on 5/27 for labs that provider requested at follow up.

## 2024-05-27 ENCOUNTER — TELEPHONE (OUTPATIENT)
Dept: HEMATOLOGY/ONCOLOGY | Facility: CLINIC | Age: 54
End: 2024-05-27
Payer: COMMERCIAL

## 2024-05-27 NOTE — TELEPHONE ENCOUNTER
Called pt in regards to having him obtain labs prior to his appt today. No answer. LVM notification.

## 2024-05-27 NOTE — TELEPHONE ENCOUNTER
Noted.  ----- Message from Vania Wilkinson sent at 5/27/2024 12:45 PM CDT -----  Type: Needs Medical Advice  Who Called:  Patient   Symptoms (please be specific):    How long has patient had these symptoms:    Pharmacy name and phone #:    Best Call Back Number: [phone]  Additional Information: Patient called to cancel all appts. on 5/27 and will call back to reschedule at later time.

## 2024-05-29 RX ORDER — TRIAMCINOLONE ACETONIDE 40 MG/ML
40 INJECTION, SUSPENSION INTRA-ARTICULAR; INTRAMUSCULAR
Status: DISCONTINUED | OUTPATIENT
Start: 2024-05-22 | End: 2024-05-29 | Stop reason: HOSPADM

## 2024-05-30 NOTE — PROCEDURES
Large Joint Aspiration/Injection: R knee    Date/Time: 5/22/2024 9:20 AM    Performed by: Itzel Barnard FNP  Authorized by: Itzel Barnard FNP    Consent Done?:  Yes (Verbal)  Indications:  Pain  Timeout: prior to procedure the correct patient, procedure, and site was verified    Prep: patient was prepped and draped in usual sterile fashion    Local anesthetic:  Lidocaine 1% without epinephrine  Anesthetic total (ml):  5      Details:  Needle Size:  21 G  Approach:  Anterolateral  Location:  Knee  Site:  R knee  Medications:  40 mg triamcinolone acetonide 40 mg/mL  Patient tolerance:  Patient tolerated the procedure well with no immediate complications

## 2024-05-30 NOTE — PROGRESS NOTES
Chief Complaint   Patient presents with    Right Knee - Pain         HPI:   This is a 54 y.o. who presents to clinic today complaining of right knee pain for 1 months after no known trauma. Pain is progressively worsening. No numbness or tingling. No associated signs or symptoms.    Past Medical History:   Diagnosis Date    Anxiety     GERD (gastroesophageal reflux disease)     Gout     History of chicken pox     HLD (hyperlipidemia)     Hypertension      Past Surgical History:   Procedure Laterality Date    ABDOMINAL SURGERY      APPENDECTOMY      COLONOSCOPY N/A 4/23/2024    Procedure: COLONOSCOPY;  Surgeon: Dawit Sorto MD;  Location: [facility];  Service: Endoscopy;  Laterality: N/A;    ESOPHAGOGASTRODUODENOSCOPY N/A 4/23/2024    Procedure: EGD (ESOPHAGOGASTRODUODENOSCOPY);  Surgeon: Dawit Sorto MD;  Location: [facility];  Service: Endoscopy;  Laterality: N/A;    TONSILLECTOMY       Current Outpatient Medications on File Prior to Visit   Medication Sig Dispense Refill    ascorbic acid,sod/zinc ox,gluc (ZINC AND C ORAL) Take 1 tablet by mouth once daily.      aspirin (ECOTRIN) 81 MG EC tablet Take 81 mg by mouth every evening.      benzonatate (TESSALON PERLES) 100 MG capsule Take 1 capsule (100 mg total) by mouth 3 (three) times daily as needed for Cough. 30 capsule 1    bisoproloL-hydrochlorothiazide (ZIAC) 10-6.25 mg per tablet Take 1 tablet by mouth once daily. 90 tablet 3    busPIRone (BUSPAR) 15 MG tablet Take 1 tablet (15 mg total) by mouth once daily. 90 tablet 3    colchicine (COLCRYS) 0.6 mg tablet Take 1 tablet (0.6 mg total) by mouth daily as needed (gout). 30 tablet 0    ELIQUIS 5 mg Tab Take 1 tablet (5 mg total) by mouth 2 (two) times daily. 180 tablet 3    esomeprazole (NEXIUM) 40 MG capsule Take 40 mg by mouth before breakfast.      esomeprazole magnesium (NEXIUM ORAL) Take 40 mg by mouth once daily.      LORazepam (ATIVAN) 1 MG tablet Take 0.5 tablets (0.5 mg total) by mouth daily as  needed for Anxiety. 30 tablet 0    sod sulf-pot chloride-mag sulf (SUTAB) 1.479-0.188- 0.225 gram tablet Take 24 tablets by mouth.      tadalafiL (CIALIS) 20 MG Tab Take 1 tablet (20 mg total) by mouth every 48 hours as needed (ED). 45 tablet 3     No current facility-administered medications on file prior to visit.     Review of patient's allergies indicates:  No Known Allergies  Family History   Problem Relation Name Age of Onset    Heart disease Mother      Hypertension Mother      Hyperlipidemia Mother      Heart disease Father      Hyperlipidemia Father      Hypertension Father       Social History     Socioeconomic History    Marital status:    Tobacco Use    Smoking status: Never    Smokeless tobacco: Never   Substance and Sexual Activity    Alcohol use: Not Currently    Drug use: No    Sexual activity: Yes     Partners: Female     Social Determinants of Health     Financial Resource Strain: Low Risk  (11/28/2023)    Overall Financial Resource Strain (CARDIA)     Difficulty of Paying Living Expenses: Not very hard   Food Insecurity: No Food Insecurity (11/28/2023)    Hunger Vital Sign     Worried About Running Out of Food in the Last Year: Never true     Ran Out of Food in the Last Year: Never true   Transportation Needs: No Transportation Needs (11/28/2023)    PRAPARE - Transportation     Lack of Transportation (Medical): No     Lack of Transportation (Non-Medical): No   Physical Activity: Insufficiently Active (11/28/2023)    Exercise Vital Sign     Days of Exercise per Week: 4 days     Minutes of Exercise per Session: 20 min   Stress: No Stress Concern Present (11/28/2023)    [employer] - Occupational Stress Questionnaire     Feeling of Stress : Only a little   Housing Stability: Low Risk  (11/28/2023)    Housing Stability Vital Sign     Unable to Pay for Housing in the Last Year: No     Number of Places Lived in the Last Year: 1     Unstable Housing in the Last Year:  No       Review of Systems:  Constitutional:  Denies fever or chills   Eyes:  Denies change in visual acuity   HENT:  Denies nasal congestion or sore throat   Respiratory:  Denies cough or shortness of breath   Cardiovascular:  Denies chest pain or edema   GI:  Denies abdominal pain, nausea, vomiting, bloody stools or diarrhea   :  Denies dysuria   Integument:  Denies rash   Neurologic:  Denies headache, focal weakness or sensory changes   Endocrine:  Denies polyuria or polydipsia   Lymphatic:  Denies swollen glands   Psychiatric:  Denies depression or anxiety     Physical Exam:   Constitutional:  Well developed, well nourished, no acute distress, non-toxic appearance   Integument:  Well hydrated, no rash   Lymphatic:  No lymphadenopathy noted   Neurologic:  Alert & oriented x 3, CN 2-12 normal, normal motor function, normal sensory function, no focal deficits noted   Psychiatric:  Speech and behavior appropriate   Eyes: EOMI  Gi: abdomen soft    Bilateral Knee Exam    Right Knee Exam     Tenderness   The patient is experiencing tenderness in the medial joint line.    Range of Motion   Extension: normal   Flexion: normal     Muscle Strength     The patient has normal knee strength.    Tests   Heriberto:  Medial - positive   Lachman:  Anterior - negative      Varus: negative  Valgus: negative  Patellar Apprehension: negative    Other   Erythema: absent  Sensation: normal  Pulse: present  Swelling: mild      Left Knee Exam   Left knee exam performed same as contralateral side and is normal.        X-rays were performed, personally reviewed by me and findings discussed with the patient.  3 views of the right knee - see below.    FINDINGS:  There is right greater than left and mild medial joint space narrowing and there is some tibial spine osteophyte formation bilaterally.                Assessment & plan    Primary osteoarthritis of right knee  -     Large Joint Aspiration/Injection: R knee  -     triamcinolone  acetonide injection 40 mg    Using an aseptic technique, I injected 5 cc of lidocaine 1% without and 1 cc of kenalog 40mg into the right knee. The patient tolerated this well. I will have them return to clinic as needed.

## 2024-09-16 PROBLEM — F32.A ANXIETY AND DEPRESSION: Status: ACTIVE | Noted: 2024-09-16

## 2024-09-16 PROBLEM — F41.9 ANXIETY AND DEPRESSION: Status: ACTIVE | Noted: 2024-09-16

## 2024-10-31 ENCOUNTER — TELEPHONE (OUTPATIENT)
Dept: HEMATOLOGY/ONCOLOGY | Facility: CLINIC | Age: 54
End: 2024-10-31
Payer: COMMERCIAL

## 2024-11-10 NOTE — PROGRESS NOTES
PATIENT: Emily Walls  MRN: [id number]  DATE: 11/11/2024      Diagnosis:   1. Thrombophilia    2. Hypertension, unspecified type    3. Hyperlipidemia, unspecified hyperlipidemia type    4. Anxiety    5. Fatty liver    6. Severe obesity (BMI >= 40)              Chief Complaint: Follow-up (Thrombophilia)      Subjective:    Interval History:Mr. Walls is a 54 y.o. male with Anxiety, GERD, Gout, HLD, HTN who presents for diagnosis of pulmonary embolism.  Since the last clinic visit the patient underwent US abdomen on 10/24/23 showing fatty liver.  The patient denies CP, cough, SOB, abdominal pain, nausea, vomiting, constipation, diarrhea.  The patient denies fever, chills, night sweats, weight loss, new lumps or bumps, easy bruising or bleeding.    Prior History:  The patient states he woke up on 01/19/2022 and had sudden onset of chest pain at 5:40 a.m..  The patient underwent CTA on 01/19/2022 showing subcentimeter mediastinal, hilar lymphadenopathy; filling defect in the lateral right middle lobe branch vessel; and filling defect in the posterior basal right lower lobe.  The patient was started on Eliquis at that time.    Past Medical History:   Past Medical History:   Diagnosis Date    Anxiety     GERD (gastroesophageal reflux disease)     Gout     History of chicken pox     Hypertension        Past Surgical HIstory:   Past Surgical History:   Procedure Laterality Date    ABDOMINAL SURGERY      APPENDECTOMY      COLONOSCOPY N/A 4/23/2024    Procedure: COLONOSCOPY;  Surgeon: Dawit Sorto MD;  Location: [facility];  Service: Endoscopy;  Laterality: N/A;    ESOPHAGOGASTRODUODENOSCOPY N/A 4/23/2024    Procedure: EGD (ESOPHAGOGASTRODUODENOSCOPY);  Surgeon: Dawit Sorto MD;  Location: [facility];  Service: Endoscopy;  Laterality: N/A;    TONSILLECTOMY         Family History:   Family History   Problem Relation Name Age of Onset    Heart disease Mother      Hypertension Mother      Hyperlipidemia Mother       Heart disease Father      Hyperlipidemia Father      Hypertension Father         Social History:  reports that he has never smoked. He has never used smokeless tobacco. He reports that he does not currently use alcohol. He reports that he does not use drugs.    Allergies:  Review of patient's allergies indicates:  No Known Allergies    Medications:  Current Outpatient Medications   Medication Sig Dispense Refill    aspirin (ECOTRIN) 81 MG EC tablet Take 81 mg by mouth every evening.      bisoproloL-hydrochlorothiazide (ZIAC) 10-6.25 mg per tablet Take 1 tablet by mouth once daily. 90 tablet 1    buPROPion (WELLBUTRIN) 75 MG tablet Take 1 tablet (75 mg total) by mouth 2 (two) times daily. 180 tablet 1    busPIRone (BUSPAR) 15 MG tablet Take 1 tablet (15 mg total) by mouth once daily. 90 tablet 3    colchicine (COLCRYS) 0.6 mg tablet Take 1 tablet (0.6 mg total) by mouth daily as needed (gout). 30 tablet 0    ELIQUIS 5 mg Tab Take 5 mg by mouth 2 (two) times daily.      esomeprazole (NEXIUM) 40 MG capsule Take 1 capsule (40 mg total) by mouth before breakfast. 90 capsule 1    LORazepam (ATIVAN) 1 MG tablet Take 0.5 tablets (0.5 mg total) by mouth daily as needed for Anxiety. 30 tablet 0    tadalafiL (CIALIS) 20 MG Tab Take 1 tablet (20 mg total) by mouth every 48 hours as needed (ED). 45 tablet 3    ascorbic acid,sod/zinc ox,gluc (ZINC AND C ORAL) Take 1 tablet by mouth once daily. (Patient not taking: Reported on 11/11/2024)       No current facility-administered medications for this visit.       Review of Systems   Constitutional:  Negative for appetite change, chills, diaphoresis, fatigue, fever and unexpected weight change.   HENT:  Negative for mouth sores.    Eyes:  Negative for visual disturbance.   Respiratory:  Negative for cough and shortness of breath.    Cardiovascular:  Negative for chest pain and palpitations.   Gastrointestinal:  Negative for abdominal pain, constipation, diarrhea, nausea and vomiting.  "  Genitourinary:  Negative for frequency.   Musculoskeletal:  Negative for back pain.   Skin:  Negative for color change and rash.   Neurological:  Negative for headaches.   Hematological:  Negative for adenopathy. Does not bruise/bleed easily.   Psychiatric/Behavioral:  The patient is not nervous/anxious.        ECOG Performance Status: 0   Objective:      Vitals:   Vitals:    11/11/24 1243   BP: 126/80   BP Location: Right arm   Patient Position: Sitting   Pulse: 67   Resp: 12   Temp: 98.1 °F (36.7 °C)   TempSrc: Temporal   SpO2: 99%   Weight: 124.1 kg (273 lb 9.5 oz)   Height: 5' 7" (1.702 m)         Physical Exam  Constitutional:       General: He is not in acute distress.     Appearance: He is well-developed. He is not diaphoretic.   HENT:      Head: Normocephalic and atraumatic.   Cardiovascular:      Rate and Rhythm: Normal rate and regular rhythm.      Heart sounds: Normal heart sounds. No murmur heard.     No friction rub. No gallop.   Pulmonary:      Effort: Pulmonary effort is normal. No respiratory distress.      Breath sounds: Normal breath sounds. No wheezing or rales.   Chest:      Chest wall: No tenderness.   Abdominal:      General: Bowel sounds are normal. There is no distension.      Palpations: Abdomen is soft. There is no mass.      Tenderness: There is no abdominal tenderness. There is no rebound.   Musculoskeletal:      Cervical back: Normal range of motion.   Lymphadenopathy:      Cervical: No cervical adenopathy.      Upper Body:      Right upper body: No supraclavicular or axillary adenopathy.      Left upper body: No supraclavicular or axillary adenopathy.   Skin:     General: Skin is warm and dry.      Findings: No erythema or rash.   Neurological:      Mental Status: He is alert and oriented to person, place, and time.   Psychiatric:         Behavior: Behavior normal.         Laboratory Data:  Lab Visit on 11/11/2024   Component Date Value Ref Range Status    WBC 11/11/2024 6.72  3.90 - " 12.70 K/uL Final    RBC 11/11/2024 4.45 (L)  4.60 - 6.20 M/uL Final    Hemoglobin 11/11/2024 14.7  14.0 - 18.0 g/dL Final    Hematocrit 11/11/2024 41.2  40.0 - 54.0 % Final    MCV 11/11/2024 93  82 - 98 fL Final    MCH 11/11/2024 33.0 (H)  27.0 - 31.0 pg Final    MCHC 11/11/2024 35.7  32.0 - 36.0 g/dL Final    RDW 11/11/2024 11.3 (L)  11.5 - 14.5 % Final    Platelets 11/11/2024 216  150 - 450 K/uL Final    MPV 11/11/2024 9.1 (L)  9.2 - 12.9 fL Final    Immature Granulocytes 11/11/2024 0.3  0.0 - 0.5 % Final    Gran # (ANC) 11/11/2024 3.9  1.8 - 7.7 K/uL Final    Immature Grans (Abs) 11/11/2024 0.02  0.00 - 0.04 K/uL Final    Comment: Mild elevation in immature granulocytes is non specific and   can be seen in a variety of conditions including stress response,   acute inflammation, trauma and pregnancy. Correlation with other   laboratory and clinical findings is essential.      Lymph # 11/11/2024 1.9  1.0 - 4.8 K/uL Final    Mono # 11/11/2024 0.6  0.3 - 1.0 K/uL Final    Eos # 11/11/2024 0.2  0.0 - 0.5 K/uL Final    Baso # 11/11/2024 0.09  0.00 - 0.20 K/uL Final    nRBC 11/11/2024 0  0 /100 WBC Final    Gran % 11/11/2024 58.1  38.0 - 73.0 % Final    Lymph % 11/11/2024 28.7  18.0 - 48.0 % Final    Mono % 11/11/2024 8.3  4.0 - 15.0 % Final    Eosinophil % 11/11/2024 3.3  0.0 - 8.0 % Final    Basophil % 11/11/2024 1.3  0.0 - 1.9 % Final    Differential Method 11/11/2024 Automated   Final    Sodium 11/11/2024 141  136 - 145 mmol/L Final    Potassium 11/11/2024 4.1  3.5 - 5.1 mmol/L Final    Chloride 11/11/2024 107  95 - 110 mmol/L Final    CO2 11/11/2024 25  23 - 29 mmol/L Final    Glucose 11/11/2024 99  70 - 110 mg/dL Final    BUN 11/11/2024 16  6 - 20 mg/dL Final    Creatinine 11/11/2024 1.0  0.5 - 1.4 mg/dL Final    Calcium 11/11/2024 9.4  8.7 - 10.5 mg/dL Final    Total Protein 11/11/2024 8.1  6.0 - 8.4 g/dL Final    Albumin 11/11/2024 3.9  3.5 - 5.2 g/dL Final    Total Bilirubin 11/11/2024 1.4 (H)  0.1 - 1.0 mg/dL  Final    Comment: For infants and newborns, interpretation of results should be based  on gestational age, weight and in agreement with clinical  observations.    Premature Infant recommended reference ranges:  Up to 24 hours.............<8.0 mg/dL  Up to 48 hours............<12.0 mg/dL  3-5 days..................<15.0 mg/dL  6-29 days.................<15.0 mg/dL      Alkaline Phosphatase 11/11/2024 36 (L)  40 - 150 U/L Final    AST 11/11/2024 58 (H)  10 - 40 U/L Final    ALT 11/11/2024 78 (H)  10 - 44 U/L Final    eGFR 11/11/2024 >60.0  >60 mL/min/1.73 m^2 Final    Anion Gap 11/11/2024 9  8 - 16 mmol/L Final         Imaging:     US Abdomen 10/24/23    The liver measures 14.8 cm. There is increased echogenicity compatible with steatosis. Main portal vein demonstrates hepatopetal flow. Visualized pancreas is unremarkable. Visualized abdominal aorta is within normal limits. Visualized IVC is within normal limits. Gallbladder demonstrates no cholelithiasis, wall thickening, pericholecystic fluid, or sonographic Garcia sign. The common bile duct measures 4 millimeters. Right kidney measures 13.3 x 6.5 x 5.1 cm. There is no hydronephrosis. There is no ascites.         Assessment:       1. Thrombophilia    2. Hypertension, unspecified type    3. Hyperlipidemia, unspecified hyperlipidemia type    4. Anxiety    5. Fatty liver    6. Severe obesity (BMI >= 40)                 Plan:     Chronic Pulmonary Embolism - The patient was diagnosed with an unpovoked PE on 1/19/22 with clots in the right lung  -Given his male gender, I recommend lifelong anticoagulation  -No need for hypercoagulable work up as the patient has not strong FHx of clotting and it would not   -PT to continue Eliquis  -Will continue to follow every 6 months    HTN - Pt on bisoprolol-HCTZ  -Will monitor    HLD - pt on lipitor  -Managemetn per cardiology    Anxiety - pt on buspar and ativan  -Management per PCP    Fatty Liver - pt instructed  on potential side effects of fatty liver including increased risk of liver disease and liver cancer  -Pt to see PCP to discuss weight loss strategies    Obesity - affects multiple aspects of pt's health  -Will monitor    Route Chart for Scheduling    Med Onc Chart Routing      Follow up with physician 6 months. Pt needs a CBC adn CMP with return appt in 6 months.   Follow up with ELEANOR    Infusion scheduling note    Injection scheduling note    Labs    Imaging    Pharmacy appointment    Other referrals                     Shekhar Wen MD  [facility]  Hematology and Oncology  [facility] [address] [place] [address]   O: [phone]  F: [phone]

## 2024-11-11 ENCOUNTER — OFFICE VISIT (OUTPATIENT)
Dept: HEMATOLOGY/ONCOLOGY | Facility: CLINIC | Age: 54
End: 2024-11-11
Payer: COMMERCIAL

## 2024-11-11 ENCOUNTER — LAB VISIT (OUTPATIENT)
Dept: LAB | Facility: HOSPITAL | Age: 54
End: 2024-11-11
Attending: INTERNAL MEDICINE
Payer: COMMERCIAL

## 2024-11-11 VITALS
DIASTOLIC BLOOD PRESSURE: 80 MMHG | BODY MASS INDEX: 42.93 KG/M2 | WEIGHT: 273.56 LBS | HEIGHT: 67 IN | TEMPERATURE: 98 F | SYSTOLIC BLOOD PRESSURE: 126 MMHG | RESPIRATION RATE: 12 BRPM | HEART RATE: 67 BPM | OXYGEN SATURATION: 99 %

## 2024-11-11 DIAGNOSIS — E78.5 HYPERLIPIDEMIA, UNSPECIFIED HYPERLIPIDEMIA TYPE: ICD-10-CM

## 2024-11-11 DIAGNOSIS — D68.59 THROMBOPHILIA: Primary | ICD-10-CM

## 2024-11-11 DIAGNOSIS — F41.9 ANXIETY: ICD-10-CM

## 2024-11-11 DIAGNOSIS — Z86.711 HISTORY OF PULMONARY EMBOLUS (PE): ICD-10-CM

## 2024-11-11 DIAGNOSIS — K76.0 FATTY LIVER: ICD-10-CM

## 2024-11-11 DIAGNOSIS — E66.01 SEVERE OBESITY (BMI >= 40): ICD-10-CM

## 2024-11-11 DIAGNOSIS — I10 HYPERTENSION, UNSPECIFIED TYPE: ICD-10-CM

## 2024-11-11 LAB
ALBUMIN SERPL BCP-MCNC: 3.9 G/DL (ref 3.5–5.2)
ALP SERPL-CCNC: 36 U/L (ref 40–150)
ALT SERPL W/O P-5'-P-CCNC: 78 U/L (ref 10–44)
ANION GAP SERPL CALC-SCNC: 9 MMOL/L (ref 8–16)
AST SERPL-CCNC: 58 U/L (ref 10–40)
BASOPHILS # BLD AUTO: 0.09 K/UL (ref 0–0.2)
BASOPHILS NFR BLD: 1.3 % (ref 0–1.9)
BILIRUB SERPL-MCNC: 1.4 MG/DL (ref 0.1–1)
BUN SERPL-MCNC: 16 MG/DL (ref 6–20)
CALCIUM SERPL-MCNC: 9.4 MG/DL (ref 8.7–10.5)
CHLORIDE SERPL-SCNC: 107 MMOL/L (ref 95–110)
CO2 SERPL-SCNC: 25 MMOL/L (ref 23–29)
CREAT SERPL-MCNC: 1 MG/DL (ref 0.5–1.4)
DIFFERENTIAL METHOD BLD: ABNORMAL
EOSINOPHIL # BLD AUTO: 0.2 K/UL (ref 0–0.5)
EOSINOPHIL NFR BLD: 3.3 % (ref 0–8)
ERYTHROCYTE [DISTWIDTH] IN BLOOD BY AUTOMATED COUNT: 11.3 % (ref 11.5–14.5)
EST. GFR  (NO RACE VARIABLE): >60 ML/MIN/1.73 M^2
GLUCOSE SERPL-MCNC: 99 MG/DL (ref 70–110)
HCT VFR BLD AUTO: 41.2 % (ref 40–54)
HGB BLD-MCNC: 14.7 G/DL (ref 14–18)
IMM GRANULOCYTES # BLD AUTO: 0.02 K/UL (ref 0–0.04)
IMM GRANULOCYTES NFR BLD AUTO: 0.3 % (ref 0–0.5)
LYMPHOCYTES # BLD AUTO: 1.9 K/UL (ref 1–4.8)
LYMPHOCYTES NFR BLD: 28.7 % (ref 18–48)
MCH RBC QN AUTO: 33 PG (ref 27–31)
MCHC RBC AUTO-ENTMCNC: 35.7 G/DL (ref 32–36)
MCV RBC AUTO: 93 FL (ref 82–98)
MONOCYTES # BLD AUTO: 0.6 K/UL (ref 0.3–1)
MONOCYTES NFR BLD: 8.3 % (ref 4–15)
NEUTROPHILS # BLD AUTO: 3.9 K/UL (ref 1.8–7.7)
NEUTROPHILS NFR BLD: 58.1 % (ref 38–73)
NRBC BLD-RTO: 0 /100 WBC
PLATELET # BLD AUTO: 216 K/UL (ref 150–450)
PMV BLD AUTO: 9.1 FL (ref 9.2–12.9)
POTASSIUM SERPL-SCNC: 4.1 MMOL/L (ref 3.5–5.1)
PROT SERPL-MCNC: 8.1 G/DL (ref 6–8.4)
RBC # BLD AUTO: 4.45 M/UL (ref 4.6–6.2)
SODIUM SERPL-SCNC: 141 MMOL/L (ref 136–145)
WBC # BLD AUTO: 6.72 K/UL (ref 3.9–12.7)

## 2024-11-11 PROCEDURE — 85025 COMPLETE CBC W/AUTO DIFF WBC: CPT | Mod: PN | Performed by: NURSE PRACTITIONER

## 2024-11-11 PROCEDURE — 99214 OFFICE O/P EST MOD 30 MIN: CPT | Mod: S$GLB,,, | Performed by: INTERNAL MEDICINE

## 2024-11-11 PROCEDURE — 99999 PR PBB SHADOW E&M-EST. PATIENT-LVL III: CPT | Mod: PBBFAC,,, | Performed by: INTERNAL MEDICINE

## 2024-11-11 PROCEDURE — 80053 COMPREHEN METABOLIC PANEL: CPT | Mod: PN | Performed by: NURSE PRACTITIONER

## 2024-11-11 PROCEDURE — 3074F SYST BP LT 130 MM HG: CPT | Mod: CPTII,S$GLB,, | Performed by: INTERNAL MEDICINE

## 2024-11-11 PROCEDURE — 3008F BODY MASS INDEX DOCD: CPT | Mod: CPTII,S$GLB,, | Performed by: INTERNAL MEDICINE

## 2024-11-11 PROCEDURE — 36415 COLL VENOUS BLD VENIPUNCTURE: CPT | Mod: PN | Performed by: NURSE PRACTITIONER

## 2024-11-11 PROCEDURE — 3079F DIAST BP 80-89 MM HG: CPT | Mod: CPTII,S$GLB,, | Performed by: INTERNAL MEDICINE

## 2024-11-11 RX ORDER — APIXABAN 5 MG/1
5 TABLET, FILM COATED ORAL 2 TIMES DAILY
COMMUNITY
Start: 2024-08-19

## 2024-12-03 ENCOUNTER — OFFICE VISIT (OUTPATIENT)
Dept: ORTHOPEDICS | Facility: CLINIC | Age: 54
End: 2024-12-03
Payer: COMMERCIAL

## 2024-12-03 DIAGNOSIS — M65.4 DE QUERVAIN'S TENOSYNOVITIS, RIGHT: ICD-10-CM

## 2024-12-03 PROCEDURE — 20550 NJX 1 TENDON SHEATH/LIGAMENT: CPT | Mod: RT,S$GLB,, | Performed by: PHYSICIAN ASSISTANT

## 2024-12-03 PROCEDURE — 99213 OFFICE O/P EST LOW 20 MIN: CPT | Mod: 25,S$GLB,, | Performed by: PHYSICIAN ASSISTANT

## 2024-12-03 PROCEDURE — 99999 PR PBB SHADOW E&M-EST. PATIENT-LVL III: CPT | Mod: PBBFAC,,, | Performed by: PHYSICIAN ASSISTANT

## 2024-12-03 PROCEDURE — 1160F RVW MEDS BY RX/DR IN RCRD: CPT | Mod: CPTII,S$GLB,, | Performed by: PHYSICIAN ASSISTANT

## 2024-12-03 PROCEDURE — 1159F MED LIST DOCD IN RCRD: CPT | Mod: CPTII,S$GLB,, | Performed by: PHYSICIAN ASSISTANT

## 2024-12-03 RX ORDER — TRIAMCINOLONE ACETONIDE 40 MG/ML
20 INJECTION, SUSPENSION INTRA-ARTICULAR; INTRAMUSCULAR
Status: DISCONTINUED | OUTPATIENT
Start: 2024-12-03 | End: 2024-12-03 | Stop reason: HOSPADM

## 2024-12-03 RX ADMIN — TRIAMCINOLONE ACETONIDE 20 MG: 40 INJECTION, SUSPENSION INTRA-ARTICULAR; INTRAMUSCULAR at 08:12

## 2024-12-03 NOTE — PROGRESS NOTES
12/3/2024    HPI:  Emily Walls is a 54 y.o. male, who presents to clinic today for evaluation of his right wrist pain and swelling.  States symptoms began over the past week or so.  States pain is worse with activity.  States pain is better with rest.  Denies any acute injuries.  Denies any other complaints at this time.    PMHX:  Past Medical History:   Diagnosis Date    Anxiety     GERD (gastroesophageal reflux disease)     Gout     History of chicken pox     Hypertension        PSHX:  Past Surgical History:   Procedure Laterality Date    ABDOMINAL SURGERY      APPENDECTOMY      COLONOSCOPY N/A 4/23/2024    Procedure: COLONOSCOPY;  Surgeon: Dawit Sorto MD;  Location: [facility];  Service: Endoscopy;  Laterality: N/A;    ESOPHAGOGASTRODUODENOSCOPY N/A 4/23/2024    Procedure: EGD (ESOPHAGOGASTRODUODENOSCOPY);  Surgeon: Dawit Sorto MD;  Location: [facility];  Service: Endoscopy;  Laterality: N/A;    TONSILLECTOMY         FMHX:  Family History   Problem Relation Name Age of Onset    Heart disease Mother      Hypertension Mother      Hyperlipidemia Mother      Heart disease Father      Hyperlipidemia Father      Hypertension Father         SOCHX:  Social History     Tobacco Use    Smoking status: Never    Smokeless tobacco: Never   Substance Use Topics    Alcohol use: Not Currently       ALLERGIES:  Patient has no known allergies.    CURRENT MEDICATIONS:  Current Outpatient Medications on File Prior to Visit   Medication Sig Dispense Refill    ascorbic acid,sod/zinc ox,gluc (ZINC AND C ORAL) Take 1 tablet by mouth once daily.      aspirin (ECOTRIN) 81 MG EC tablet Take 81 mg by mouth every evening.      bisoproloL-hydrochlorothiazide (ZIAC) 10-6.25 mg per tablet Take 1 tablet by mouth once daily. 90 tablet 1    buPROPion (WELLBUTRIN) 75 MG tablet Take 1 tablet (75 mg total) by mouth 2 (two) times daily. 180 tablet 1    busPIRone (BUSPAR) 15 MG tablet Take 1 tablet (15 mg total) by mouth once daily. 90  tablet 3    colchicine (COLCRYS) 0.6 mg tablet Take 1 tablet (0.6 mg total) by mouth daily as needed (gout). 30 tablet 0    ELIQUIS 5 mg Tab Take 5 mg by mouth 2 (two) times daily.      esomeprazole (NEXIUM) 40 MG capsule Take 1 capsule (40 mg total) by mouth before breakfast. 90 capsule 1    LORazepam (ATIVAN) 1 MG tablet Take 0.5 tablets (0.5 mg total) by mouth daily as needed for Anxiety. 30 tablet 0    meloxicam (MOBIC) 7.5 MG tablet Take 1 tablet (7.5 mg total) by mouth daily as needed for Pain. 30 tablet 0    tadalafiL (CIALIS) 20 MG Tab Take 1 tablet (20 mg total) by mouth every 48 hours as needed (ED). 45 tablet 3     No current facility-administered medications on file prior to visit.       REVIEW OF SYSTEMS:  Review of Systems Complete; Negative, unless noted above.    GENERAL PHYSICAL EXAM:   There were no vitals taken for this visit.   GEN: well developed, well nourished, no acute distress   PULM: No wheezing, no respiratory distress   CV: RRR    ORTHO EXAM:   Examination of the right wrist reveals mild edema of the 1st extensor compartment.  No erythema, ecchymosis, or skin breakdown.  Tenderness palpation of the 1st extensor compartment.  Positive Finkelstein's test.  No significant tenderness palpation of the remainder of the right hand/wrist.  Normal sensation in the radial, ulnar, and median nerve distributions.  Capillary refill less than 2 seconds.    RADIOLOGY:   None.    ASSESSMENT:   De Quervain tenosynovitis of the right wrist    PLAN:  1. I discussed with Emily Walls the de Quervain tenosynovitis pathology and treatment options in detail during today's visit.  After treatment options were discussed, we decided the best course of action this time is to proceed with a steroid injection into the 1st extensor compartment of the right wrist.  We discussed the meantime we would place him in a removable Velcro thumb spica splint of the right thumb/wrist in clinic today.  He verbally agreed  with the treatment plan.      2. Informed consent was obtained.  After an alcohol prep followed by a chlorhexidine prep, a steroid injection was placed into the 1st extensor compartment of the right wrist.  He tolerated the procedure well with no immediate complications     3.  He was placed in a removable Velcro thumb spica splint of the right thumb/wrist in clinic today.    4.  He was written a note for work stating he is able to return to work if his condition is improved on December 11, 2024.      5.  I would like him follow-up in clinic in 3 weeks for repeat evaluation.  He was instructed to contact the clinic for any problems or concerns in the interim.

## 2024-12-03 NOTE — LETTER
December 3, 2024      [facility]  [address]  [place] [address]  Phone: [phone]       Patient: Emily Walls   YOB: 1970  Date of Visit: 12/03/2024    To Whom It May Concern:    Mesfin Walls  was at [facility] on 12/03/2024. The patient may return to work/school on 12/11/2024 as long as symptoms have resolved. If you have any questions or concerns, or if I can be of further assistance, please do not hesitate to contact me.    Sincerely,    Jonathan Donahue PA-C

## 2024-12-03 NOTE — PROCEDURES
Tendon Sheath    Date/Time: 12/3/2024 8:40 AM    Performed by: Jonathan Donahue PA-C  Authorized by: Jonathan Donahue PA-C    Consent Done?:  Yes (Verbal)  Indications:  Pain  Site marked: the procedure site was marked    Timeout: prior to procedure the correct patient, procedure, and site was verified    Prep: patient was prepped and draped in usual sterile fashion      Local anesthesia used?: Yes    Local anesthetic:  Lidocaine 1% without epinephrine  Anesthetic total (ml):  0.5    Location:  Wrist  Site:  R first doral compartment  Ultrasonic guidance for needle placement?: No    Needle size:  25 G  Approach:  Volar  Medications:  20 mg triamcinolone acetonide 40 mg/mL (20 mg injected)  Patient tolerance:  Patient tolerated the procedure well with no immediate complications

## 2024-12-06 ENCOUNTER — TELEPHONE (OUTPATIENT)
Dept: ORTHOPEDICS | Facility: CLINIC | Age: 54
End: 2024-12-06
Payer: COMMERCIAL

## 2024-12-06 NOTE — TELEPHONE ENCOUNTER
----- Message from Emily sent at 12/6/2024  1:18 PM CST -----  Type: Needs Medical Advice  Who Called:  pt  Preferred Pharmacy and Phone Number:   [facility] - [place], [place] - [address]  [address] [address]  Phone: [phone] Fax: [phone]  Best Call Back Number: [phone]  Additional Information: pt is calling in regards to needing to speak to the office. Pt states he was in earlier in the week and got a shot. Pt states that he is still in pain and want to see if anything can be called in for him besides anti-inflammatories. Please call back and advise. Thanks!    I called Emily Walls via telephone, but there was no answer.  A voicemail was left discussing that the injection may take up to 2 weeks to begin providing relief to his symptoms.  I also discussed that narcotic pain medication would be inappropriate to prescribe, and that the other treatment option would be to maintain the thumb spica Velcro brace and continue with the oral anti-inflammatories.  I also discussed that if he is concerned of redness, swelling, and worsening pain to report to the emergency department.

## 2024-12-09 ENCOUNTER — TELEPHONE (OUTPATIENT)
Dept: ORTHOPEDICS | Facility: CLINIC | Age: 54
End: 2024-12-09
Payer: COMMERCIAL

## 2024-12-09 NOTE — TELEPHONE ENCOUNTER
Spoke with caller and appointment made to see Jontahan to discuss options.   ----- Message from Maryam sent at 12/9/2024 11:10 AM CST -----  Regarding: advice  Contact: patient  Type: Needs Medical Advice  Who Called:  patient  Symptoms (please be specific):  wrist pain  How long has patient had these symptoms:    Pharm[facility] and phone #[facility] [address] [address]  Phone: [phone] Fax: [phone]      Best Call Back Number: [phone] (home)     Additional Information: Patient is requesting something for pain.  He also needs a longer work note.  Patient will be having company fax over [facility] paperwork.  Please call patient to advise.  Thanks!

## 2024-12-10 ENCOUNTER — HOSPITAL ENCOUNTER (OUTPATIENT)
Dept: RADIOLOGY | Facility: HOSPITAL | Age: 54
Discharge: HOME | End: 2024-12-10
Attending: PHYSICIAN ASSISTANT
Payer: COMMERCIAL

## 2024-12-10 ENCOUNTER — OFFICE VISIT (OUTPATIENT)
Dept: ORTHOPEDICS | Facility: CLINIC | Age: 54
End: 2024-12-10
Payer: COMMERCIAL

## 2024-12-10 VITALS — WEIGHT: 273.13 LBS | HEIGHT: 67 IN | BODY MASS INDEX: 42.87 KG/M2

## 2024-12-10 DIAGNOSIS — M19.90 INFLAMMATORY ARTHROPATHY: ICD-10-CM

## 2024-12-10 DIAGNOSIS — M79.641 PAIN OF RIGHT HAND: Primary | ICD-10-CM

## 2024-12-10 DIAGNOSIS — M79.89 HAND SWELLING: ICD-10-CM

## 2024-12-10 PROCEDURE — 73130 X-RAY EXAM OF HAND: CPT | Mod: TC,PO,RT

## 2024-12-10 PROCEDURE — 3008F BODY MASS INDEX DOCD: CPT | Mod: CPTII,S$GLB,, | Performed by: PHYSICIAN ASSISTANT

## 2024-12-10 PROCEDURE — 99213 OFFICE O/P EST LOW 20 MIN: CPT | Mod: S$GLB,,, | Performed by: PHYSICIAN ASSISTANT

## 2024-12-10 PROCEDURE — 99999 PR PBB SHADOW E&M-EST. PATIENT-LVL II: CPT | Mod: PBBFAC,,, | Performed by: PHYSICIAN ASSISTANT

## 2024-12-10 PROCEDURE — 73130 X-RAY EXAM OF HAND: CPT | Mod: 26,RT,, | Performed by: RADIOLOGY

## 2024-12-10 RX ORDER — METHYLPREDNISOLONE 4 MG/1
TABLET ORAL
Qty: 21 EACH | Refills: 0 | Status: SHIPPED | OUTPATIENT
Start: 2024-12-10 | End: 2024-12-31

## 2024-12-10 NOTE — LETTER
December 10, 2024    Emily Walls  [address]  [place] [address]         [facility]  [address]  [facility] [address]  Phone: [phone] December 10, 2024     Patient: Emily Walls   YOB: 1970   Date of Visit: 12/10/2024       To Whom It May Concern:    It is my medical opinion that Emily Walls should remain out of work until seen again in clinic on 12/17/2024 .    If you have any questions or concerns, please don't hesitate to call.    Sincerely,        Jonathan Donahue PA-C

## 2024-12-10 NOTE — PROGRESS NOTES
12/10/2024    HPI:  Emily Walls is a 54 y.o. male, who presents to clinic today for continued evaluation of his right hand/wrist pain.  States he has now developed significant swelling of the right hand.  States that has relatively painful.  Denies any acute injuries since his last visit.  States the pain of his wrist has mostly resolved.  Denies any other complaints at this time.    PMHX:  Past Medical History:   Diagnosis Date    Anxiety     GERD (gastroesophageal reflux disease)     Gout     History of chicken pox     Hypertension        PSHX:  Past Surgical History:   Procedure Laterality Date    ABDOMINAL SURGERY      APPENDECTOMY      COLONOSCOPY N/A 4/23/2024    Procedure: COLONOSCOPY;  Surgeon: Dawit Sorto MD;  Location: [facility];  Service: Endoscopy;  Laterality: N/A;    ESOPHAGOGASTRODUODENOSCOPY N/A 4/23/2024    Procedure: EGD (ESOPHAGOGASTRODUODENOSCOPY);  Surgeon: Dawit Sorto MD;  Location: [facility];  Service: Endoscopy;  Laterality: N/A;    TONSILLECTOMY         FMHX:  Family History   Problem Relation Name Age of Onset    Heart disease Mother      Hypertension Mother      Hyperlipidemia Mother      Heart disease Father      Hyperlipidemia Father      Hypertension Father         SOCHX:  Social History     Tobacco Use    Smoking status: Never    Smokeless tobacco: Never   Substance Use Topics    Alcohol use: Not Currently       ALLERGIES:  Patient has no known allergies.    CURRENT MEDICATIONS:  Current Outpatient Medications on File Prior to Visit   Medication Sig Dispense Refill    ascorbic acid,sod/zinc ox,gluc (ZINC AND C ORAL) Take 1 tablet by mouth once daily.      aspirin (ECOTRIN) 81 MG EC tablet Take 81 mg by mouth every evening.      bisoproloL-hydrochlorothiazide (ZIAC) 10-6.25 mg per tablet Take 1 tablet by mouth once daily. 90 tablet 1    buPROPion (WELLBUTRIN) 75 MG tablet Take 1 tablet (75 mg total) by mouth 2 (two) times daily. 180 tablet 1    busPIRone (BUSPAR) 15  "MG tablet Take 1 tablet (15 mg total) by mouth once daily. 90 tablet 3    colchicine (COLCRYS) 0.6 mg tablet Take 1 tablet (0.6 mg total) by mouth daily as needed (gout). 30 tablet 0    ELIQUIS 5 mg Tab TAKE 1 TABLET BY MOUTH TWICE DAILY 180 tablet 3    esomeprazole (NEXIUM) 40 MG capsule Take 1 capsule (40 mg total) by mouth before breakfast. 90 capsule 1    LORazepam (ATIVAN) 1 MG tablet Take 0.5 tablets (0.5 mg total) by mouth daily as needed for Anxiety. 30 tablet 0    meloxicam (MOBIC) 7.5 MG tablet Take 1 tablet (7.5 mg total) by mouth daily as needed for Pain. 30 tablet 0    tadalafiL (CIALIS) 20 MG Tab Take 1 tablet (20 mg total) by mouth every 48 hours as needed (ED). 45 tablet 3     No current facility-administered medications on file prior to visit.       REVIEW OF SYSTEMS:  Review of Systems Complete; Negative, unless noted above.    GENERAL PHYSICAL EXAM:   Ht 5' 7" (1.702 m)   Wt 123.9 kg (273 lb 2.4 oz)   BMI 42.78 kg/m²    GEN: well developed, well nourished, no acute distress   PULM: No wheezing, no respiratory distress   CV: RRR    ORTHO EXAM:   Examination of the right hand reveals mild to moderate edema.  No erythema, ecchymosis, or skin breakdown.  Able to flex extend the fingers appropriately.  Tenderness with terminal range of motion of the fingers.  Tenderness with terminal flexion/extension range of motion of the wrist.  Normal sensation in the radial, ulnar, and median nerve distributions.  Capillary refill less than 2 seconds.    RADIOLOGY:   X-rays of the right hand were taken today in clinic.  X-rays read by myself.  Imaging showed the presence of mild soft tissue swelling.  No acute fracture or dislocation no subluxation.  No radiopaque foreign body or mass noted no osseous destructive/erosive processes noted.  States presence of of what appears to be an erosion over the distal radial aspect of the lunate, which may be indicative of psoriatic arthritis or inflammatory arthropathy.  No " other significant bony abnormalities noted.    ASSESSMENT:   Inflammatory arthropathy of the right hand/wrist    PLAN:  1. I discussed with Emily Walls that the best course of action this time is to proceed with a course of oral anti-inflammatories via a Medrol Dosepak.  He verbally agreed with the treatment plan     2. He was written a note for work stating that he is unable to use the right hand/arm until seen again in clinic     3.  He was prescribed a Medrol Dosepak in clinic today.  He was instructed to discontinue medication for any adverse effects.      3.  I would like to have him follow up in clinic in 1 week for repeat evaluation.  He was instructed to contact clinic for any problems or concerns in the interim.

## 2024-12-17 ENCOUNTER — OFFICE VISIT (OUTPATIENT)
Dept: ORTHOPEDICS | Facility: CLINIC | Age: 54
End: 2024-12-17
Payer: COMMERCIAL

## 2024-12-17 DIAGNOSIS — M79.641 PAIN OF RIGHT HAND: ICD-10-CM

## 2024-12-17 DIAGNOSIS — M19.90 INFLAMMATORY ARTHROPATHY: Primary | ICD-10-CM

## 2024-12-17 PROCEDURE — 1159F MED LIST DOCD IN RCRD: CPT | Mod: CPTII,S$GLB,, | Performed by: PHYSICIAN ASSISTANT

## 2024-12-17 PROCEDURE — 99999 PR PBB SHADOW E&M-EST. PATIENT-LVL III: CPT | Mod: PBBFAC,,, | Performed by: PHYSICIAN ASSISTANT

## 2024-12-17 PROCEDURE — 99213 OFFICE O/P EST LOW 20 MIN: CPT | Mod: S$GLB,,, | Performed by: PHYSICIAN ASSISTANT

## 2024-12-17 PROCEDURE — 1160F RVW MEDS BY RX/DR IN RCRD: CPT | Mod: CPTII,S$GLB,, | Performed by: PHYSICIAN ASSISTANT

## 2024-12-17 NOTE — PROGRESS NOTES
12/17/2024    HPI:  Emily Walls is a 54 y.o. male, who presents to clinic today for continued evaluation of his right hand/wrist inflammatory arthropathy.  States the Medrol Dosepak greatly improved his symptoms.  States his symptoms are relatively minimal at this time.  States he continues to have some mild pain of the wrist with certain activities, but states the severe pain has resolved.  Denies acute injuries since last visit.  Denies any other complaints this time.    PMHX:  Past Medical History:   Diagnosis Date    Anxiety     GERD (gastroesophageal reflux disease)     Gout     History of chicken pox     Hypertension        PSHX:  Past Surgical History:   Procedure Laterality Date    ABDOMINAL SURGERY      APPENDECTOMY      COLONOSCOPY N/A 4/23/2024    Procedure: COLONOSCOPY;  Surgeon: Dawit Sorto MD;  Location: [facility];  Service: Endoscopy;  Laterality: N/A;    ESOPHAGOGASTRODUODENOSCOPY N/A 4/23/2024    Procedure: EGD (ESOPHAGOGASTRODUODENOSCOPY);  Surgeon: Dawit Sorto MD;  Location: [facility];  Service: Endoscopy;  Laterality: N/A;    TONSILLECTOMY         FMHX:  Family History   Problem Relation Name Age of Onset    Heart disease Mother      Hypertension Mother      Hyperlipidemia Mother      Heart disease Father      Hyperlipidemia Father      Hypertension Father         SOCHX:  Social History     Tobacco Use    Smoking status: Never    Smokeless tobacco: Never   Substance Use Topics    Alcohol use: Not Currently       ALLERGIES:  Patient has no known allergies.    CURRENT MEDICATIONS:  Current Outpatient Medications on File Prior to Visit   Medication Sig Dispense Refill    ascorbic acid,sod/zinc ox,gluc (ZINC AND C ORAL) Take 1 tablet by mouth once daily.      aspirin (ECOTRIN) 81 MG EC tablet Take 81 mg by mouth every evening.      bisoproloL-hydrochlorothiazide (ZIAC) 10-6.25 mg per tablet Take 1 tablet by mouth once daily. 90 tablet 1    buPROPion (WELLBUTRIN) 75 MG tablet Take 1  tablet (75 mg total) by mouth 2 (two) times daily. 180 tablet 1    busPIRone (BUSPAR) 15 MG tablet Take 1 tablet (15 mg total) by mouth once daily. 90 tablet 3    colchicine (COLCRYS) 0.6 mg tablet Take 1 tablet (0.6 mg total) by mouth daily as needed (gout). 30 tablet 0    ELIQUIS 5 mg Tab TAKE 1 TABLET BY MOUTH TWICE DAILY 180 tablet 3    esomeprazole (NEXIUM) 40 MG capsule Take 1 capsule (40 mg total) by mouth before breakfast. 90 capsule 1    LORazepam (ATIVAN) 1 MG tablet Take 0.5 tablets (0.5 mg total) by mouth daily as needed for Anxiety. 30 tablet 0    meloxicam (MOBIC) 7.5 MG tablet Take 1 tablet (7.5 mg total) by mouth daily as needed for Pain. 30 tablet 0    methylPREDNISolone (MEDROL DOSEPACK) 4 mg tablet use as directed 21 each 0    tadalafiL (CIALIS) 20 MG Tab Take 1 tablet (20 mg total) by mouth every 48 hours as needed (ED). 45 tablet 3     No current facility-administered medications on file prior to visit.       REVIEW OF SYSTEMS:  Review of Systems Complete; Negative, unless noted above.    GENERAL PHYSICAL EXAM:   There were no vitals taken for this visit.   GEN: well developed, well nourished, no acute distress   PULM: No wheezing, no respiratory distress   CV: RRR    ORTHO EXAM:   Examination of the right hand/wrist reveals no edema, erythema, ecchymosis, or skin breakdown.  Mild tenderness palpation of the ulnar aspect of the right wrist.  Presence of minimal tenderness palpation of the radiocarpal joint of the right wrist.  Mild tenderness with terminal flexion/extension range of motion of the right wrist.  5/5 /intrinsic strength.  Normal sensation in the radial, ulnar, median nerve distributions.  Capillary refill is 2 seconds.    RADIOLOGY:   None.    ASSESSMENT:   Inflammatory arthropathy of the right hand/wrist    PLAN:  1. I discussed with Emily Walls that the best course of action this time is to continue immobilization via a removable Velcro short wrist splint for all  activity when he is out of the house for the next 2 weeks.  We also discussed we would hold him from using that right hand/wrist for work until this coming Monday.  He verbalized understanding and verbally agreed with the treatment plan.      2. His FMLA we will be filled out in clinic today holding him from December 6 to December 23rd.    3.  I would like him follow up in clinic in 4 weeks for repeat evaluation.  He was instructed to contact the clinic for any problems or concerns in the interim.

## 2024-12-20 ENCOUNTER — TELEPHONE (OUTPATIENT)
Dept: ORTHOPEDICS | Facility: CLINIC | Age: 54
End: 2024-12-20
Payer: COMMERCIAL

## 2024-12-20 DIAGNOSIS — M19.90 INFLAMMATORY ARTHROPATHY: Primary | ICD-10-CM

## 2024-12-20 DIAGNOSIS — M79.641 PAIN OF RIGHT HAND: ICD-10-CM

## 2024-12-20 NOTE — TELEPHONE ENCOUNTER
Spoke with caller. Per Jonathan patient was informed we could not do the full 12 weeks to start but could do it through his follow up with us. Patient was agreeable to this. Patient state he would reach out to his HR to gets forms faxed over.   ----- Message from Irena sent at 12/20/2024 10:20 AM CST -----  Contact: self  Type:  Needs Medical Advice    Who Called: self  Symptoms (please be specific): needs to speak to nurse and needs dr to fill out FMLA paperwork for the full 12 weeks just incase. Please call pt for more info.  Would the patient rather a call back or a response via [facility]? call  Best Call Back Number: [phone] (home)     Additional Information: please advise and thank you.

## 2024-12-20 NOTE — TELEPHONE ENCOUNTER
----- Message from Cipriano sent at 12/20/2024  9:43 AM CST -----  Patient would like a callback regarding the pain in his hand getting worse since his visit.    [phone]

## 2025-01-02 ENCOUNTER — TELEPHONE (OUTPATIENT)
Dept: ORTHOPEDICS | Facility: CLINIC | Age: 55
End: 2025-01-02
Payer: COMMERCIAL

## 2025-01-02 NOTE — TELEPHONE ENCOUNTER
Spoke with caller and let him know we will send paperwork out as soon as Jonathan signs it.   ----- Message from Med Assistant Villanueva sent at 1/2/2025  9:22 AM CST -----  Contact: pt  Call  regarding paperwork  has this been received    Call back

## 2025-01-03 ENCOUNTER — TELEPHONE (OUTPATIENT)
Dept: ORTHOPEDICS | Facility: CLINIC | Age: 55
End: 2025-01-03
Payer: COMMERCIAL

## 2025-01-03 NOTE — TELEPHONE ENCOUNTER
----- Message from Cipriano sent at 1/3/2025  1:39 PM CST -----  Patient would like a callback regarding the status of his short term disability paperwork.    [phone]

## 2025-01-06 ENCOUNTER — TELEPHONE (OUTPATIENT)
Dept: ORTHOPEDICS | Facility: CLINIC | Age: 55
End: 2025-01-06
Payer: COMMERCIAL

## 2025-01-06 NOTE — TELEPHONE ENCOUNTER
----- Message from Cipriano sent at 1/6/2025  3:19 PM CST -----  Patient would like a callback regarding the status of his short term disability paperwork.     [phone]

## 2025-01-08 ENCOUNTER — OFFICE VISIT (OUTPATIENT)
Dept: ORTHOPEDICS | Facility: CLINIC | Age: 55
End: 2025-01-08
Payer: COMMERCIAL

## 2025-01-08 DIAGNOSIS — M19.90 INFLAMMATORY ARTHROPATHY: Primary | ICD-10-CM

## 2025-01-08 PROCEDURE — 1159F MED LIST DOCD IN RCRD: CPT | Mod: CPTII,S$GLB,, | Performed by: PHYSICIAN ASSISTANT

## 2025-01-08 PROCEDURE — 99999 PR PBB SHADOW E&M-EST. PATIENT-LVL III: CPT | Mod: PBBFAC,,, | Performed by: PHYSICIAN ASSISTANT

## 2025-01-08 PROCEDURE — 99213 OFFICE O/P EST LOW 20 MIN: CPT | Mod: S$GLB,,, | Performed by: PHYSICIAN ASSISTANT

## 2025-01-08 PROCEDURE — 1160F RVW MEDS BY RX/DR IN RCRD: CPT | Mod: CPTII,S$GLB,, | Performed by: PHYSICIAN ASSISTANT

## 2025-01-08 NOTE — PROGRESS NOTES
1/8/2025    HPI:  Emily Walls is a 54 y.o. male, who presents to clinic today for continued evaluation of his right hand/wrist pain and swelling.  States his symptoms have improved slightly, but continues to have significant pain of the hand/wrist.  States pain is much worse with activity.  States pain is better with rest.  Denies acute injuries since his last visit.  Denies any other complaints at this time.    PMHX:  Past Medical History:   Diagnosis Date    Anxiety     GERD (gastroesophageal reflux disease)     Gout     History of chicken pox     Hypertension        PSHX:  Past Surgical History:   Procedure Laterality Date    ABDOMINAL SURGERY      APPENDECTOMY      COLONOSCOPY N/A 4/23/2024    Procedure: COLONOSCOPY;  Surgeon: Dawit Sorto MD;  Location: [facility];  Service: Endoscopy;  Laterality: N/A;    ESOPHAGOGASTRODUODENOSCOPY N/A 4/23/2024    Procedure: EGD (ESOPHAGOGASTRODUODENOSCOPY);  Surgeon: Dawit Sorto MD;  Location: [facility];  Service: Endoscopy;  Laterality: N/A;    TONSILLECTOMY         FMHX:  Family History   Problem Relation Name Age of Onset    Heart disease Mother      Hypertension Mother      Hyperlipidemia Mother      Heart disease Father      Hyperlipidemia Father      Hypertension Father         SOCHX:  Social History     Tobacco Use    Smoking status: Never    Smokeless tobacco: Never   Substance Use Topics    Alcohol use: Not Currently       ALLERGIES:  Patient has no known allergies.    CURRENT MEDICATIONS:  Current Outpatient Medications on File Prior to Visit   Medication Sig Dispense Refill    ascorbic acid,sod/zinc ox,gluc (ZINC AND C ORAL) Take 1 tablet by mouth once daily.      aspirin (ECOTRIN) 81 MG EC tablet Take 81 mg by mouth every evening.      bisoproloL-hydrochlorothiazide (ZIAC) 10-6.25 mg per tablet Take 1 tablet by mouth once daily. 90 tablet 1    buPROPion (WELLBUTRIN) 75 MG tablet Take 1 tablet (75 mg total) by mouth 2 (two) times daily. 180 tablet  1    busPIRone (BUSPAR) 15 MG tablet Take 1 tablet (15 mg total) by mouth once daily. 90 tablet 3    colchicine (COLCRYS) 0.6 mg tablet Take 1 tablet (0.6 mg total) by mouth daily as needed (gout). 30 tablet 0    ELIQUIS 5 mg Tab TAKE 1 TABLET BY MOUTH TWICE DAILY 180 tablet 3    esomeprazole (NEXIUM) 40 MG capsule Take 1 capsule (40 mg total) by mouth before breakfast. 90 capsule 1    LORazepam (ATIVAN) 1 MG tablet Take 0.5 tablets (0.5 mg total) by mouth daily as needed for Anxiety. 30 tablet 0    meloxicam (MOBIC) 7.5 MG tablet Take 1 tablet (7.5 mg total) by mouth daily as needed for Pain. 30 tablet 0    tadalafiL (CIALIS) 20 MG Tab TAKE 1 TABLET BY MOUTH EVERY 48 HOURS AS NEEDED (FOR ERECTILE DYSFUNCTION) 45 tablet 3    [DISCONTINUED] tadalafiL (CIALIS) 20 MG Tab Take 1 tablet (20 mg total) by mouth every 48 hours as needed (ED). 45 tablet 3     No current facility-administered medications on file prior to visit.       REVIEW OF SYSTEMS:  Review of Systems Complete; Negative, unless noted above.    GENERAL PHYSICAL EXAM:   There were no vitals taken for this visit.   GEN: well developed, well nourished, no acute distress   PULM: No wheezing, no respiratory distress   CV: RRR    ORTHO EXAM:   Examination of the right hand/wrist reveals mild edema.  No erythema, ecchymosis, or skin breakdown.  Tenderness palpation of the dorsum of the radiocarpal joint.  Mild tenderness palpation of the extensor tendons/musculature of the forearm/wrist.  No significant tenderness palpation throughout the remainder of the right hand/wrist.  Normal sensation in the radial, ulnar, median nerve distributions.  Capillary refill less than 2 seconds.    RADIOLOGY:   An MRI of the right hand/wrist with and without contrast was taken 6 days ago.  The MRI was reviewed by myself in clinic today.  Imaging showed no acute fracture or dislocation.  No subluxation.  The tendons and ligaments remain intact.  Presence of a cyst versus erosion of  the capitate and trapezoid.  Presence of trace edema/fluid of the ECRB tendon.  Presence of synovial thickening of the carpus, which is indicative of inflammatory arthropathy.  No other significant abnormalities noted.    LABS:   The autoimmune panel resulted in a positive SHAYNA, which is indicative of an underlying rheumatologic cause as the source of the inflammatory arthropathy.    ASSESSMENT:   Inflammatory arthropathy with likely rheumatologic origin    PLAN:  1. I discussed with Emilybelkys Brar Titi that given his positive lab results and MRI showing the evidence of inflammatory arthropathy that the best course of action this time is to refer him to rheumatology for further evaluation and treatment.  We discussed for future pharmacologic treatment he should reach out to his primary care until he is under the care of a rheumatologist.  We did discuss if he would like to try an intra-articular steroid injection to contact the clinic for reappointment.  We did discuss that due to the inflammatory arthropathy in his level of pain he would likely be unable to perform the duties required him of his current job and we would hold him out on short-term disability until January 27th, 2025.  He verbally agreed with the treatment plan.      2.  He was referred to rheumatology in clinic today.      3. His FMLA paperwork was filled out an updated in clinic today.      4. I would like to have him follow up in clinic on a p.r.n. basis.  He was instructed to contact the clinic for any problems or concerns.

## 2025-05-05 PROBLEM — I27.82 CHRONIC PULMONARY EMBOLISM: Status: ACTIVE | Noted: 2025-05-05

## 2025-05-05 PROBLEM — E78.1 HYPERTRIGLYCERIDEMIA: Status: ACTIVE | Noted: 2025-05-05

## 2025-05-06 ENCOUNTER — PATIENT MESSAGE (OUTPATIENT)
Dept: HEMATOLOGY/ONCOLOGY | Facility: CLINIC | Age: 55
End: 2025-05-06
Payer: COMMERCIAL